# Patient Record
Sex: FEMALE | Race: ASIAN | NOT HISPANIC OR LATINO | Employment: UNEMPLOYED | URBAN - METROPOLITAN AREA
[De-identification: names, ages, dates, MRNs, and addresses within clinical notes are randomized per-mention and may not be internally consistent; named-entity substitution may affect disease eponyms.]

---

## 2019-06-04 ENCOUNTER — OFFICE VISIT - HEALTHEAST (OUTPATIENT)
Dept: FAMILY MEDICINE | Facility: CLINIC | Age: 39
End: 2019-06-04

## 2019-06-04 DIAGNOSIS — N64.4 BREAST PAIN, RIGHT: ICD-10-CM

## 2019-06-04 DIAGNOSIS — N91.2 AMENORRHEA: ICD-10-CM

## 2019-06-04 DIAGNOSIS — N63.41 LUMP IN CENTRAL PORTION OF RIGHT BREAST: ICD-10-CM

## 2019-06-04 LAB — HCG UR QL: NEGATIVE

## 2019-06-04 ASSESSMENT — MIFFLIN-ST. JEOR: SCORE: 1168.03

## 2019-06-10 ENCOUNTER — HOSPITAL ENCOUNTER (OUTPATIENT)
Dept: MAMMOGRAPHY | Facility: CLINIC | Age: 39
Discharge: HOME | End: 2019-06-10
Attending: FAMILY MEDICINE

## 2019-06-10 DIAGNOSIS — N64.4 BREAST PAIN, RIGHT: ICD-10-CM

## 2019-06-10 DIAGNOSIS — N63.41 LUMP IN CENTRAL PORTION OF RIGHT BREAST: ICD-10-CM

## 2021-03-31 ENCOUNTER — AMBULATORY - HEALTHEAST (OUTPATIENT)
Dept: NURSING | Facility: CLINIC | Age: 41
End: 2021-03-31

## 2021-04-21 ENCOUNTER — AMBULATORY - HEALTHEAST (OUTPATIENT)
Dept: NURSING | Facility: CLINIC | Age: 41
End: 2021-04-21

## 2021-05-29 NOTE — PROGRESS NOTES
OFFICE VISIT NOTE      Assessment & Plan   Nichelle Soriano is a 39 y.o. female with right breast pain that came on - she thinks- after lifting a heavy water bottle. She might have strained her muscles and now the inflammation is traveling around. However it might be that the pain is from the breast/breast lump, so we'll evaluate that with u/s and mammogram.  In the mean time, she'll use heat, massage and medications to manage the pain.    Diagnoses and all orders for this visit:    Breast pain, right  -     Cancel: Mammo Diagnostic Right; Future; Expected date: 06/04/2019  -     US Breast Limited (Focal) Right; Future; Expected date: 06/04/2019  -     Mammo Diagnostic Bilateral; Future; Expected date: 06/04/2019    Lump in central portion of right breast  -     Cancel: Mammo Diagnostic Right; Future; Expected date: 06/04/2019  -     US Breast Limited (Focal) Right; Future; Expected date: 06/04/2019  -     Mammo Diagnostic Bilateral; Future; Expected date: 06/04/2019    Amenorrhea  -     Pregnancy, Urine        Fifi Mason MD              Subjective:   Chief Complaint:  Pain (from R breast, over shoulder, to back after carrying heavy water bottle 2 weeks ago)    39 y.o. female.     1) carried some heavy water container, in early May  Pain started the next day  She's been to the ER twice  1st time GERD- gave a med (ranitidine per chart) that helped that, but breast still hurt  2nd time xray OK    Taken ibuprofen  Tylenol - taken by her at home  ER said not to take any med, but if needed, take ibuprofen      Current Outpatient Medications   Medication Sig     condoms latex lubricated Penelope Use as directed.     ferrous gluconate (FERGON) 324 MG tablet Take 1 tablet (324 mg total) by mouth daily with breakfast.     ibuprofen (ADVIL,MOTRIN) 400 MG tablet Take 400 mg by mouth 2 (two) times a day as needed.     norgestimate-ethinyl estradiol (ORTHO TRI-CYCLEN LO, 28,) 0.18/0.215/0.25 mg-25 mcg Tab tablet Take 1 tablet  "by mouth daily.     norgestimate-ethinyl estradiol (TRI-SPRINTEC, 28,) 0.18/0.215/0.25 mg-35 mcg (28) Tab tablet Take 1 tablet by mouth daily.     ranitidine (ZANTAC) 150 MG capsule Take 1 capsule (150 mg total) by mouth daily.       PSFHx: appropriate sections of PMH completed/filled in   Tobacco Status:  She  reports that she has never smoked. She has never used smokeless tobacco.    Review of Systems:  No fever.  No rash. All other systems negative except as noted above.    Objective:    /66 (Patient Site: Left Arm, Patient Position: Sitting, Cuff Size: Adult Regular)   Pulse 94   Temp 98.1  F (36.7  C) (Oral)   Ht 4' 11.5\" (1.511 m)   Wt 127 lb 12 oz (57.9 kg)   LMP 05/12/2019 (Exact Date)   SpO2 99%   BMI 25.37 kg/m    GENERAL: No acute distress.  HT: reg s1s2  Lungs: clear with good aeration  Chest wall: no bruising, redness or swelling;  Breasts: hang with no dimpling or nipple retraction; palpation on the left is negative; palpation on the right is either normal or there might be a flat mass about 1x1x0.5cm oval-shaped, just above the areola. It feels a lot like the surrounding breast tissue but is smoother.  Back: main pain is between the right scapula and the spine - where the muscle is tense but not in a knot  Right arm: ROM is full    CXR 5/27 was negative    Spent 25 min face to face with patient with more the 50% spent in counseling, education and coordination of care and discussing breast pain, muscle pain, medications.    "

## 2021-05-30 ENCOUNTER — RECORDS - HEALTHEAST (OUTPATIENT)
Dept: ADMINISTRATIVE | Facility: CLINIC | Age: 41
End: 2021-05-30

## 2021-05-31 ENCOUNTER — RECORDS - HEALTHEAST (OUTPATIENT)
Dept: ADMINISTRATIVE | Facility: CLINIC | Age: 41
End: 2021-05-31

## 2021-06-02 ENCOUNTER — RECORDS - HEALTHEAST (OUTPATIENT)
Dept: ADMINISTRATIVE | Facility: CLINIC | Age: 41
End: 2021-06-02

## 2021-06-03 VITALS — WEIGHT: 127.75 LBS | HEIGHT: 60 IN | BODY MASS INDEX: 25.08 KG/M2

## 2022-07-29 ENCOUNTER — OFFICE VISIT (OUTPATIENT)
Dept: FAMILY MEDICINE | Facility: CLINIC | Age: 42
End: 2022-07-29
Payer: COMMERCIAL

## 2022-07-29 VITALS
WEIGHT: 141.5 LBS | HEART RATE: 60 BPM | BODY MASS INDEX: 28.1 KG/M2 | SYSTOLIC BLOOD PRESSURE: 132 MMHG | DIASTOLIC BLOOD PRESSURE: 78 MMHG

## 2022-07-29 DIAGNOSIS — Z23 IMMUNIZATION DUE: ICD-10-CM

## 2022-07-29 DIAGNOSIS — M77.8 FOREARM TENDONITIS: Primary | ICD-10-CM

## 2022-07-29 PROCEDURE — 90715 TDAP VACCINE 7 YRS/> IM: CPT | Performed by: FAMILY MEDICINE

## 2022-07-29 PROCEDURE — 90471 IMMUNIZATION ADMIN: CPT | Performed by: FAMILY MEDICINE

## 2022-07-29 PROCEDURE — 99203 OFFICE O/P NEW LOW 30 MIN: CPT | Mod: 25 | Performed by: FAMILY MEDICINE

## 2022-07-29 ASSESSMENT — PATIENT HEALTH QUESTIONNAIRE - PHQ9
SUM OF ALL RESPONSES TO PHQ QUESTIONS 1-9: 6
10. IF YOU CHECKED OFF ANY PROBLEMS, HOW DIFFICULT HAVE THESE PROBLEMS MADE IT FOR YOU TO DO YOUR WORK, TAKE CARE OF THINGS AT HOME, OR GET ALONG WITH OTHER PEOPLE: NOT DIFFICULT AT ALL
SUM OF ALL RESPONSES TO PHQ QUESTIONS 1-9: 6

## 2022-07-29 NOTE — PROGRESS NOTES
Assessment & Plan     1. Forearm tendonitis  - Orthopedic  Referral; Future  - Wrist/Arm/Hand Supplies Order for DME - ONLY FOR DME  - Occupational Therapy Referral; Future  - diclofenac (VOLTAREN) 1 % topical gel; Apply 4 g topically 4 times daily as needed for moderate pain  Dispense: 350 g; Refill: 0    Symptoms are consistent with forearm tendinitis, more wrist dorsiflexion tendons rather than supination pronation or biceps tendinitis.  Recommend immobilization of the wrist to help decrease ongoing overuse.  Did not think Tylenol or ibuprofen are helping.  We will trial focal topical diclofenac gel.  Recommend trial of occupational therapy.  If symptoms not improving, recommend seeing orthopedics for further evaluation.    2. Immunization due  - TDAP VACCINE (Adacel, Boostrix)       Return in about 3 months (around 10/29/2022) for Physical Exam with pap .    Angelica Gonzales, [facility]    Preet Steward is a 42 year old accompanied by her sister, presenting for the following health issues:  Arm Pain (X1 yr)      Arm Pain    History of Present Illness       Reason for visit:  Right arm hurts    She eats 2-3 servings of fruits and vegetables daily.She consumes 4 sweetened beverage(s) daily.She exercises with enough effort to increase her heart rate 9 or less minutes per day.  She exercises with enough effort to increase her heart rate 3 or less days per week.   She is taking medications regularly.    Today's PHQ-9         PHQ-9 Total Score: 6    PHQ-9 Q9 Thoughts of better off dead/self-harm past 2 weeks :   Not at all    How difficult have these problems made it for you to do your work, take care of things at home, or get along with other people: Not difficult at all     Right handed.     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 Gradual worsening of arm pain, no trauma or injuries, not an acute onset. Pain is the  worst by the elbow, more towards the lateral side. Hurts with arm extension, feels better when flexes elbow. Is worse when picking things up. Rates pain 7-8 in the morning, feels okay at night when sleeping.    No weakness. No swelling in arm.     Using tylenol, icy hot. Hasn't helped at all. Has tried ibuprofen as well, not working.     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 Has not seen a doctor for these symptoms before.     HEALTH MAINTENANCE:   - tetanus: will do today    - PCV: declined     Review of Systems   See HPI above.       Objective    /78 (BP Location: Left arm, Patient Position: Sitting, Cuff Size: Adult Regular)   Pulse 60   Wt 64.2 kg (141 lb 8 oz)   LMP 06/19/2022 (Approximate)   Breastfeeding No   BMI 28.10 kg/m    Body mass index is 28.1 kg/m .  Physical Exam   GENERAL: healthy, alert and no distress  RESP: lungs clear to auscultation - no rales, rhonchi or wheezes  CV: regular rate and rhythm, normal S1 S2, no S3 or S4, no murmur, click or rub, no peripheral edema and peripheral pulses strong  MS: Normal shoulder range of motion bilaterally, normal elbow range of motion bilaterally, pain with active extension of arm pain.  Exacerbated with active wrist dorsiflexion.  No pain with active or passive supination or pronation.  No pain with passive elbow extension or wrist extension.      .  ..

## 2022-11-17 ENCOUNTER — OFFICE VISIT (OUTPATIENT)
Dept: FAMILY MEDICINE | Facility: CLINIC | Age: 42
End: 2022-11-17
Payer: COMMERCIAL

## 2022-11-17 VITALS
HEIGHT: 59 IN | RESPIRATION RATE: 12 BRPM | BODY MASS INDEX: 28.68 KG/M2 | HEART RATE: 65 BPM | WEIGHT: 142.25 LBS | SYSTOLIC BLOOD PRESSURE: 106 MMHG | DIASTOLIC BLOOD PRESSURE: 62 MMHG | TEMPERATURE: 98.5 F | OXYGEN SATURATION: 99 %

## 2022-11-17 DIAGNOSIS — M77.8 FOREARM TENDONITIS: ICD-10-CM

## 2022-11-17 DIAGNOSIS — Z32.01 PREGNANCY TEST POSITIVE: ICD-10-CM

## 2022-11-17 DIAGNOSIS — N92.6 MISSED PERIOD: Primary | ICD-10-CM

## 2022-11-17 LAB — HCG UR QL: POSITIVE

## 2022-11-17 PROCEDURE — 81025 URINE PREGNANCY TEST: CPT | Performed by: FAMILY MEDICINE

## 2022-11-17 PROCEDURE — 99213 OFFICE O/P EST LOW 20 MIN: CPT | Performed by: FAMILY MEDICINE

## 2022-11-17 RX ORDER — ACETAMINOPHEN 500 MG
500-1000 TABLET ORAL EVERY 6 HOURS PRN
Qty: 90 TABLET | Refills: 1 | Status: SHIPPED | OUTPATIENT
Start: 2022-11-17 | End: 2023-09-26

## 2022-11-17 RX ORDER — PRENATAL VIT/IRON FUM/FOLIC AC 27MG-0.8MG
1 TABLET ORAL DAILY
Qty: 90 TABLET | Refills: 3 | Status: SHIPPED | OUTPATIENT
Start: 2022-11-17 | End: 2023-09-26

## 2022-11-17 NOTE — PROGRESS NOTES
"  Assessment & Plan     Missed period  The patient has had regular periods monthlyExcept for the last 2 months.  Her home pregnancy test was positive    - HCG qualitative urine    Pregnancy test positive  Confirm pregnancy today by lab test.  She was informed of the results.  She will be started on prenatal vitamins she can set up an initial OB visit for self we discussed the potential complications of having a pregnancy started at the age of 42.  She understands that she should not consume any caffeinated products.  She should not smoke.  She should not drink any alcohol.  - Prenatal Vit-Fe Fumarate-FA (PRENATAL MULTIVITAMIN W/IRON) 27-0.8 MG tablet; Take 1 tablet by mouth daily    Forearm tendonitis    Because she is not pregnant she should not take any ibuprofen or anti-inflammatories I have prescribed Tylenol for her discomfort  - acetaminophen (TYLENOL) 500 MG tablet; Take 1-2 tablets (500-1,000 mg) by mouth every 6 hours as needed for mild pain             BMI:   Estimated body mass index is 28.73 kg/m  as calculated from the following:    Height as of this encounter: 1.499 m (4' 11\").    Weight as of this encounter: 64.5 kg (142 lb 4 oz).           Return in about 4 weeks (around 12/15/2022) for with any available provider.    Loi Trejo MD  [facility] ADEBAYO Steward is a 42 year old, presenting for the following health issues:  Pregnancy Test  History of presenting illness    42-year-old female here to discuss recent pregnancy test results at home she has noted.  Since September and has had a home pregnancy test which was positive.  She reports that she does not use contraception her youngest child is age 11 years.    History of Present Illness       Reason for visit:  Pregnancy test              Review of Systems   Constitutional, HEENT, cardiovascular, pulmonary, gi and gu systems are negative, except as otherwise noted.      Objective    /62   Pulse 65   Temp " "98.5  F (36.9  C) (Oral)   Resp 12   Ht 1.499 m (4' 11\")   Wt 64.5 kg (142 lb 4 oz)   LMP 09/09/2022 (Approximate)   SpO2 99%   BMI 28.73 kg/m    Body mass index is 28.73 kg/m .  Physical Exam   GENERAL: healthy, alert and no distress  MS: no gross musculoskeletal defects noted, no edema  SKIN: no suspicious lesions or rashes  NEURO: Normal strength and tone, mentation intact and speech normal  PSYCH: mentation appears normal, affect normal/bright            "

## 2022-11-23 ENCOUNTER — TELEPHONE (OUTPATIENT)
Dept: FAMILY MEDICINE | Facility: CLINIC | Age: 42
End: 2022-11-23

## 2022-11-23 DIAGNOSIS — Z32.01 PREGNANCY TEST POSITIVE: Primary | ICD-10-CM

## 2022-11-23 NOTE — TELEPHONE ENCOUNTER
Please call pt:  I see that she is on my scheduled for IOB on 12/9/22:  It would be helpful if she could get an ultrasound before the next appt so that we know her due date. I've put in an order. Someone should call her to schedule, or she can call them at  [phone].

## 2022-11-23 NOTE — TELEPHONE ENCOUNTER
Clinic RN contacted patient to relay provider message regarding initial OB and US.  Patient does not want to take down the phone number due to language barrier.  However, will wait for their call.  RN advised to call back to clinic if does not hear back by imaging department in the next three days for assistance.  Patient understood recommendation.    CHARLY Santos, RN  [facility]

## 2022-11-29 ENCOUNTER — TRANSFERRED RECORDS (OUTPATIENT)
Dept: HEALTH INFORMATION MANAGEMENT | Facility: CLINIC | Age: 42
End: 2022-11-29

## 2022-11-29 ENCOUNTER — MEDICAL CORRESPONDENCE (OUTPATIENT)
Dept: HEALTH INFORMATION MANAGEMENT | Facility: CLINIC | Age: 42
End: 2022-11-29

## 2022-11-29 ENCOUNTER — HOSPITAL ENCOUNTER (OUTPATIENT)
Dept: ULTRASOUND IMAGING | Facility: HOSPITAL | Age: 42
Discharge: HOME | End: 2022-11-29
Attending: FAMILY MEDICINE | Admitting: FAMILY MEDICINE
Payer: COMMERCIAL

## 2022-11-29 DIAGNOSIS — Z32.01 PREGNANCY TEST POSITIVE: ICD-10-CM

## 2022-11-29 PROCEDURE — 76801 OB US < 14 WKS SINGLE FETUS: CPT

## 2022-12-09 ENCOUNTER — PRENATAL OFFICE VISIT (OUTPATIENT)
Dept: FAMILY MEDICINE | Facility: CLINIC | Age: 42
End: 2022-12-09
Payer: COMMERCIAL

## 2022-12-09 VITALS
BODY MASS INDEX: 28.48 KG/M2 | TEMPERATURE: 98.3 F | SYSTOLIC BLOOD PRESSURE: 110 MMHG | HEART RATE: 68 BPM | HEIGHT: 59 IN | OXYGEN SATURATION: 99 % | WEIGHT: 141.25 LBS | RESPIRATION RATE: 16 BRPM | DIASTOLIC BLOOD PRESSURE: 60 MMHG

## 2022-12-09 DIAGNOSIS — D56.8 OTHER THALASSEMIA (H): ICD-10-CM

## 2022-12-09 DIAGNOSIS — N96 RECURRENT PREGNANCY LOSS: ICD-10-CM

## 2022-12-09 DIAGNOSIS — O09.529 HIGH-RISK PREGNANCY, ELDERLY MULTIGRAVIDA, UNSPECIFIED TRIMESTER: Primary | ICD-10-CM

## 2022-12-09 DIAGNOSIS — Z98.891 HISTORY OF CESAREAN DELIVERY: ICD-10-CM

## 2022-12-09 PROCEDURE — 99215 OFFICE O/P EST HI 40 MIN: CPT | Performed by: FAMILY MEDICINE

## 2022-12-09 NOTE — PROGRESS NOTES
Assessment /Plan     Nichelle Soriano is a 42 year old   at 10w0d who was scheduled for her IOB appointment today.  However, after extensive discussion of her history and risk factors, we have decided together that she would be best serves by seeing OBGYN for this pregnancy.  Therefore, I did not obtain IOB labs today in anticipation of an OBGYN appt soon.    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    I spent a total of 50 minutes on the day of the visit.   Time spent doing chart review, history and exam, documentation and further activities per the note   Extensive chart review was needed due to patient not having exact details of previous deliveries and then being difficult to find in the archives.  Also had to get records through care everywhere from [facility].    Subjective:    Nichelle Soriano is a 42 year old  here today for her First Obstetrical Exam.     Was feeling very tired, but now better.  No questions or concerns  She has 4 living children and history of several miscarriages and stillbirths.  Last pregnancy was in .  We have not had records of that, because it was through [facility] but records were obtained showing 35-week 1 day delivery by  after unsuccessful induction for hydrops.  Hydrops is contributed to the death of 2 other children, and this was presumably due to thalassemia disease as both she and her  her alpha thalassemia carriers.  Father of her children is the same for all pregnancies, including this 1.    OB History    Para Term  AB Living   11 7 5 2 3 4   SAB IAB Ectopic Multiple Live Births   3 0 0 0 4  "     # Outcome Date GA Lbr Lexa/2nd Weight Sex Delivery Anes PTL Lv   11 Current            10  11 35w1d   F CS-LTranv   FRANK      Birth Comments: Unsuccessful TOLAC induction for hydrops, so C-sxn done      Name: Celeste   9 Term 09 39w0d  3.062 kg (6 lb 12 oz) F    FRANK      Name: Alla   8 Term 08 40w0d  3.487 kg (7 lb 11 oz) M    FRANK      Name: Dewey   7 SAB  4w0d          6   33w5d   M    FD      Birth Comments: IUFD, hydrops   5 Term  40w0d  3.629 kg (8 lb) M    FRANK      Name: Rony   4 SAB  8w0d          3 SAB  8w0d          2 Term  40w0d   F CS-Unspec   FD      Birth Comments: IUFD, hydrops   1 Term                History reviewed. No pertinent past medical history.  Past Surgical History:   Procedure Laterality Date      SECTION       Social History     Tobacco Use     Smoking status: Never     Smokeless tobacco: Never     Tobacco comments:     no passive exposure   Substance Use Topics     Alcohol use: No     Drug use: No     Current Outpatient Medications   Medication Sig Dispense Refill     acetaminophen (TYLENOL) 500 MG tablet Take 1-2 tablets (500-1,000 mg) by mouth every 6 hours as needed for mild pain 90 tablet 1     Prenatal Vit-Fe Fumarate-FA (PRENATAL MULTIVITAMIN W/IRON) 27-0.8 MG tablet Take 1 tablet by mouth daily 90 tablet 3     Allergies   Allergen Reactions     No Known Drug Allergies Unknown       Family History   Problem Relation Age of Onset     Thalassemia Daughter         Deanna Thalassemia DISEASE     Hashimoto's thyroiditis Daughter      No Known Problems Son      No Known Problems Son        Objective:   Objective    Vitals:    22 0929   BP: 110/60   Pulse: 68   Resp: 16   Temp: 98.3  F (36.8  C)   TempSrc: Oral   SpO2: 99%   Weight: 64.1 kg (141 lb 4 oz)   Height: 1.499 m (4' 11\")     Physical Exam:  General Appearance: Alert, cooperative, no distress, appears stated age  Abd: Uterine fundus below " pubic symphysis.  Fetal heart tones not audible by [place]      Diagnostics:    EXAM: US OB < 14 WEEKS SINGLE-TRANSABDOMINAL   LOCATION: [facility]   DATE/TIME: 11/29/2022 9:15 AM   INDICATION: Positive pregnancy; please eval for viability and gestational age   COMPARISON: None.   TECHNIQUE: Transabdominal scans were performed.   FINDINGS: UTERUS: Single normal appearing intrauterine gestation sac. CRL: Measures 1.9 cm, equals 8 weeks 4 days. FETAL HEART RATE: 165 bpm. AMNIOTIC FLUID: Normal. PLACENTA: Not yet formed. No evidence for sub-chorionic hemorrhage. RIGHT OVARY: Normal. LEFT OVARY: Normal.   IMPRESSION: 1.  Single living intrauterine gestation at 8 weeks 4 days, EDC 7/7/2023.

## 2022-12-28 ENCOUNTER — TRANSFERRED RECORDS (OUTPATIENT)
Dept: HEALTH INFORMATION MANAGEMENT | Facility: CLINIC | Age: 42
End: 2022-12-28

## 2022-12-28 ENCOUNTER — LAB REQUISITION (OUTPATIENT)
Dept: LAB | Facility: CLINIC | Age: 42
End: 2022-12-28

## 2022-12-28 DIAGNOSIS — O09.91 SUPERVISION OF HIGH RISK PREGNANCY, UNSPECIFIED, FIRST TRIMESTER: ICD-10-CM

## 2022-12-28 LAB
BASOPHILS # BLD AUTO: 0 10E3/UL (ref 0–0.2)
BASOPHILS NFR BLD AUTO: 0 %
EOSINOPHIL # BLD AUTO: 0.2 10E3/UL (ref 0–0.7)
EOSINOPHIL NFR BLD AUTO: 3 %
ERYTHROCYTE [DISTWIDTH] IN BLOOD BY AUTOMATED COUNT: 16.3 % (ref 10–15)
HCT VFR BLD AUTO: 36.4 % (ref 35–47)
HGB BLD-MCNC: 11.3 G/DL (ref 11.7–15.7)
HOLD SPECIMEN: NORMAL
IMM GRANULOCYTES # BLD: 0 10E3/UL
IMM GRANULOCYTES NFR BLD: 1 %
LYMPHOCYTES # BLD AUTO: 1.3 10E3/UL (ref 0.8–5.3)
LYMPHOCYTES NFR BLD AUTO: 18 %
MCH RBC QN AUTO: 21.3 PG (ref 26.5–33)
MCHC RBC AUTO-ENTMCNC: 31 G/DL (ref 31.5–36.5)
MCV RBC AUTO: 69 FL (ref 78–100)
MONOCYTES # BLD AUTO: 0.5 10E3/UL (ref 0–1.3)
MONOCYTES NFR BLD AUTO: 7 %
NEUTROPHILS # BLD AUTO: 5.3 10E3/UL (ref 1.6–8.3)
NEUTROPHILS NFR BLD AUTO: 71 %
NRBC # BLD AUTO: 0 10E3/UL
NRBC BLD AUTO-RTO: 0 /100
PLATELET # BLD AUTO: 373 10E3/UL (ref 150–450)
RBC # BLD AUTO: 5.31 10E6/UL (ref 3.8–5.2)
WBC # BLD AUTO: 7.3 10E3/UL (ref 4–11)

## 2022-12-28 PROCEDURE — 85025 COMPLETE CBC W/AUTO DIFF WBC: CPT | Performed by: OBSTETRICS & GYNECOLOGY

## 2022-12-28 PROCEDURE — 86901 BLOOD TYPING SEROLOGIC RH(D): CPT | Performed by: OBSTETRICS & GYNECOLOGY

## 2022-12-28 PROCEDURE — 86780 TREPONEMA PALLIDUM: CPT | Performed by: OBSTETRICS & GYNECOLOGY

## 2022-12-28 PROCEDURE — 86803 HEPATITIS C AB TEST: CPT | Performed by: OBSTETRICS & GYNECOLOGY

## 2022-12-28 PROCEDURE — 87086 URINE CULTURE/COLONY COUNT: CPT | Performed by: OBSTETRICS & GYNECOLOGY

## 2022-12-28 PROCEDURE — 87340 HEPATITIS B SURFACE AG IA: CPT | Performed by: OBSTETRICS & GYNECOLOGY

## 2022-12-28 PROCEDURE — 87389 HIV-1 AG W/HIV-1&-2 AB AG IA: CPT | Performed by: OBSTETRICS & GYNECOLOGY

## 2022-12-28 PROCEDURE — 87491 CHLMYD TRACH DNA AMP PROBE: CPT | Performed by: OBSTETRICS & GYNECOLOGY

## 2022-12-29 ENCOUNTER — TRANSFERRED RECORDS (OUTPATIENT)
Dept: HEALTH INFORMATION MANAGEMENT | Facility: CLINIC | Age: 42
End: 2022-12-29

## 2022-12-29 LAB
ABO/RH(D): NORMAL
ANTIBODY SCREEN: NEGATIVE
C TRACH DNA SPEC QL PROBE+SIG AMP: NEGATIVE
HBV SURFACE AG SERPL QL IA: NONREACTIVE
HCV AB SERPL QL IA: NONREACTIVE
HIV 1+2 AB+HIV1 P24 AG SERPL QL IA: NONREACTIVE
N GONORRHOEA DNA SPEC QL NAA+PROBE: NEGATIVE
SPECIMEN EXPIRATION DATE: NORMAL
T PALLIDUM AB SER QL: NONREACTIVE

## 2022-12-30 LAB — BACTERIA UR CULT: NO GROWTH

## 2023-01-11 ENCOUNTER — TRANSFERRED RECORDS (OUTPATIENT)
Dept: HEALTH INFORMATION MANAGEMENT | Facility: CLINIC | Age: 43
End: 2023-01-11

## 2023-01-11 ENCOUNTER — MEDICAL CORRESPONDENCE (OUTPATIENT)
Dept: HEALTH INFORMATION MANAGEMENT | Facility: CLINIC | Age: 43
End: 2023-01-11

## 2023-01-12 ENCOUNTER — TRANSFERRED RECORDS (OUTPATIENT)
Dept: HEALTH INFORMATION MANAGEMENT | Facility: CLINIC | Age: 43
End: 2023-01-12

## 2023-01-12 ENCOUNTER — APPOINTMENT (OUTPATIENT)
Dept: INTERPRETER SERVICES | Facility: CLINIC | Age: 43
End: 2023-01-12
Payer: COMMERCIAL

## 2023-01-12 ENCOUNTER — TRANSCRIBE ORDERS (OUTPATIENT)
Dept: MATERNAL FETAL MEDICINE | Facility: HOSPITAL | Age: 43
End: 2023-01-12

## 2023-01-12 DIAGNOSIS — O26.90 PREGNANCY RELATED CONDITION, ANTEPARTUM: Primary | ICD-10-CM

## 2023-01-12 DIAGNOSIS — D56.8 OTHER THALASSEMIA (H): Primary | ICD-10-CM

## 2023-01-13 ENCOUNTER — MEDICAL CORRESPONDENCE (OUTPATIENT)
Dept: HEALTH INFORMATION MANAGEMENT | Facility: CLINIC | Age: 43
End: 2023-01-13

## 2023-01-25 ENCOUNTER — LAB REQUISITION (OUTPATIENT)
Dept: LAB | Facility: CLINIC | Age: 43
End: 2023-01-25

## 2023-01-25 DIAGNOSIS — Z3A.16 16 WEEKS GESTATION OF PREGNANCY: ICD-10-CM

## 2023-01-25 LAB — HOLD SPECIMEN: NORMAL

## 2023-01-25 PROCEDURE — 82105 ALPHA-FETOPROTEIN SERUM: CPT | Performed by: OBSTETRICS & GYNECOLOGY

## 2023-01-27 ENCOUNTER — PRE VISIT (OUTPATIENT)
Dept: MATERNAL FETAL MEDICINE | Facility: HOSPITAL | Age: 43
End: 2023-01-27
Payer: COMMERCIAL

## 2023-01-28 LAB
# FETUSES US: NORMAL
AFP MOM SERPL: 0.92
AFP SERPL-MCNC: 36 NG/ML
AGE - REPORTED: 41.5 YR
CURRENT SMOKER: NO
FAMILY MEMBER DISEASES HX: NORMAL
GA METHOD: NORMAL
GA: NORMAL WK
IDDM PATIENT QL: NO
INTEGRATED SCN PATIENT-IMP: NORMAL
SPECIMEN DRAWN SERPL: NORMAL

## 2023-02-02 ENCOUNTER — ANCILLARY PROCEDURE (OUTPATIENT)
Dept: ULTRASOUND IMAGING | Facility: HOSPITAL | Age: 43
End: 2023-02-02
Attending: OBSTETRICS & GYNECOLOGY
Payer: COMMERCIAL

## 2023-02-02 ENCOUNTER — OFFICE VISIT (OUTPATIENT)
Dept: MATERNAL FETAL MEDICINE | Facility: HOSPITAL | Age: 43
End: 2023-02-02
Attending: OBSTETRICS & GYNECOLOGY
Payer: COMMERCIAL

## 2023-02-02 ENCOUNTER — LAB (OUTPATIENT)
Dept: LAB | Facility: HOSPITAL | Age: 43
End: 2023-02-02
Attending: OBSTETRICS & GYNECOLOGY
Payer: COMMERCIAL

## 2023-02-02 ENCOUNTER — TRANSFERRED RECORDS (OUTPATIENT)
Dept: HEALTH INFORMATION MANAGEMENT | Facility: CLINIC | Age: 43
End: 2023-02-02

## 2023-02-02 DIAGNOSIS — D56.8 OTHER THALASSEMIA (H): Primary | ICD-10-CM

## 2023-02-02 DIAGNOSIS — Z36.89 ENCOUNTER FOR FETAL ANATOMIC SURVEY: Primary | ICD-10-CM

## 2023-02-02 DIAGNOSIS — D56.8 OTHER THALASSEMIA (H): ICD-10-CM

## 2023-02-02 PROCEDURE — 76805 OB US >/= 14 WKS SNGL FETUS: CPT | Mod: 26 | Performed by: STUDENT IN AN ORGANIZED HEALTH CARE EDUCATION/TRAINING PROGRAM

## 2023-02-02 PROCEDURE — 99204 OFFICE O/P NEW MOD 45 MIN: CPT | Mod: 25 | Performed by: STUDENT IN AN ORGANIZED HEALTH CARE EDUCATION/TRAINING PROGRAM

## 2023-02-02 PROCEDURE — 36415 COLL VENOUS BLD VENIPUNCTURE: CPT

## 2023-02-02 PROCEDURE — 76805 OB US >/= 14 WKS SNGL FETUS: CPT

## 2023-02-02 PROCEDURE — 76821 MIDDLE CEREBRAL ARTERY ECHO: CPT | Mod: 26 | Performed by: STUDENT IN AN ORGANIZED HEALTH CARE EDUCATION/TRAINING PROGRAM

## 2023-02-02 PROCEDURE — 96040 HC GENETIC COUNSELING, EACH 30 MINUTES: CPT | Performed by: GENETIC COUNSELOR, MS

## 2023-02-02 NOTE — NURSING NOTE
Patient reports good fetal movements, denies pain, denies contractions, leaking of fluid, or bleeding. SBAR given to VIJAY HASSAN, see their note in Epic.

## 2023-02-02 NOTE — PROGRESS NOTES
Please see the full imaging report from the [employer] program under the imaging tab.    Ladan Rosen MD  Maternal Fetal Medicine

## 2023-02-02 NOTE — PROGRESS NOTES
[facility]  Genetic Counseling Consult    Patient:  Nichelle Soriano YOB: 1980   Date of Service:  23   MRN: [id number]    Nichelle was seen at the [facility] for genetic consultation. The indication for genetic counseling is family history concern. The patient was accompanied to this visit by their . The patient and their accompanied individual is wearing a mask due to current [facility] policies.      The session was conducted with a [employer] ipad  due to the patient speaking limited English.      IMPRESSION/ PLAN   1. Nichelle had genetic screening earlier in this pregnancy. Their non-invasive prenatal test was screen negative or low risk for screened conditions     2. During today's [facility] visit, Nichelle had a blood draw for [place] carrier screening and fetal risk assessment for alpha thalassemia. Results are expected in 2-3 weeks. The patient will be called with results and if they do not answer they requested a detailed message with results on their voicemail.     Sulaiman pregnancy is at a 1 in 4 (25%) chance of having alpha thalassemia major (Hb Barts) which unfortunately is associated with a high chance of fetal demise or critically ill  requiring extensive care. If this non-invasive option resulted positive, we would be managing the pregnancy very closely as if there was a confirmed diagnosis of the condition. If this non-invasive option resulted negative, the residual chance would be 1 in 400. Management would still be increased but there would be less concern over prenatal features.     3. Nichelle had a level II comprehensive anatomy ultrasound today. Please see the ultrasound report for further details.    4. Nichelle is scheduled for follow-up ultrasound with [facility] for 2023.     PREGNANCY HISTORY   /Parity:       Sulaiman pregnancy history is  significant for:     2000: 40w, IUFD, hydrops (limited info)    2003: 8w SAB    2003: 8w SAB    2004: 40w, male    2006: 33w5d, IUFD, hydrops (limited info)    2006: 4w SAB    06/08: 40w, male    07/09: 39w, female    01/11: 35w1d, hydrops, induction of labor leading to c section, this daughter survived due to weekly blood transfusions before a bone marrow transplants around 4-5 years of age from an unrelated donor     CURRENT PREGNANCY   Current Age: 43 year old   Age at Delivery: 43 year old  GONZALEZ: 7/7/2023, by Ultrasound                                   Gestational Age: 17w6d  This pregnancy is a single gestation.   This pregnancy was conceived spontaneously.    MEDICAL HISTORY   Nichelle garrison reported medical history is not expected to impact pregnancy management or risks to fetal development.       FAMILY HISTORY   A three-generation pedigree was not obtained today due to our focus on other topics. Nichelle states her and her 's family is healthy.    Naya's children that passed in 2000 and 2006 very likely had the same condition, alpha thalassemia, from which their daughter was ill. Multiple documentation describes those intrauterine fetal demises as having the complication of hydrops, which is a hallmark prenatal sign of alpha thalassemia major (also called Hb Barts). Documentation from her pregnancy in 2011 also notes the pregnancy had hydrops consistent with Hb Barts. Nichelle provided verbal permission to access her daughter's chart which documents treatment for Hb Barts (four full gene deletions). [facility] Nichelle to be a carrier (??/--).    Hemoglobin is a major component of red blood cells. Hemoglobinopathies are conditions that affect the level or structure of the hemoglobin and cause conditions such as thalassemia or conditions like sickle cell disease. An individual with a hemoglobinopathy often inherited the  condition from their carrier parents. Some carriers can also have symptoms. Lab values like mean corpuscular volume (MCV) can suggest a hemoglobinopathy is present when the value is low.     The following is true for a typical individual:    4 copies of alpha genes that encode for the alpha subunit of hemoglobin (2 copies of HBA1 gene + 2 copies of HBA2 gene)     2 copies of HBB gene that encodes for the beta subunit     2 copies of HBD gene that encodes for the delta subunit     4 copies of gamma genes that encode for the gamma subunit of hemoglobin. This is typically only expressed during fetal development and decreases after birth when the beta subunit is more represented    Thalassemia conditions are caused by reductions in the alpha or beta subunit. Variant conditions, like sickle cell disease, are cause by mutations in the HBB gene that modify the structure or function of the beta subunit. Hemoglobinopathies have a wide spectrum of symptoms because an individual can have a condition due to the combination of different mutations or even mutations on an alpha and beta gene. Many hemoglobinopathies are screened for on the [place] Screening program.    Alpha thalassemia is caused by a reduction in the alpha subunit. We discussed common genotypes for carriers and affected individuals of alpha thalassemia:    Typical: (??/??) Four copies of alpha    Silent Carrier: (??/?-) Three copies of alpha    Typically asymptomatic    Alpha Thalassemia trait: (??/--) or (?-/?-) Two copies of alpha    Typically range from asymptomatic to mild anemia and abnormal red blood cell labs.     The arrangement of alpha copies is important to determine because in the situation in which the two copies are on one chromosome, that parent could pass on zero copies of alpha genes (--) or (??).      Hemoglobin H Disease: (?-/--) One copy of alpha    Symptoms can range from asymptomatic to anemia with jaundice, fatigue, bone  deformities, fatigue, and other minor complications.symptoms can range from asymptomatic to anemia with jaundice, fatigue, bone deformities, fatigue, and other minor complications    Detected on  screen due to detection of hemoglobin barts which is four copies of the gamma (??/??) subunit stuck together due to a shortage of alpha subunits to join with. Typically fetal hemoglobin should be (??/??)    Alpha thalassemia major/ Hb Barts hydrops fetalis:  (--/--) Zero copies of alpha    This condition is associated with death in utero due to hydrops fetalis. If born an affected baby will be likely stillborn or die soon after birth. Experimental prenatal bone marrow transplants may be curative. If babies survive past birth they are typically dependent on chronic transfusions and often need a curative bone marrow transplant.     Detected on  screen due to detection of hemoglobin barts which is four copies of the gamma (??/??) subunit stuck together due to a shortage of alpha subunits to join with. Typically fetal hemoglobin should be (??/??)    We reviewed the reproductive risks of the alpha combinations that Nichelle (??/--) and her partner (likely (??/--)) could pass to their children:  1) 25% (??/??)   2) 50% (??/--)   3) 25%  (--/--) Hb Barts     Nichelle's healthy children are either (??/??) or  (??/--) [carriers like Nichelle and her ]. Nichelle's babies that passed away and their daughter with health issues is  (--/--) Hb Barts . This pregnancy also has a 25% chance of having Hb Barts.     CARRIER SCREENING   Expanded carrier screening is available to screen for autosomal recessive conditions and X-linked conditions in a large list of genes. Autosomal recessive conditions happen when a mutation has been inherited from the egg and sperm and include conditions like cystic fibrosis, thalassemia, hearing loss, spinal muscular atrophy, and more. X-linked conditions happen when a mutation has  been inherited from the egg and include conditions like fragile X syndrome.  screening was also reviewed. About [place]  Screening    Carrier screening was not discussed today. If the patient is interested in further discussing the option of carrier screening, MFM would be available to assist in coordination if desired.       RISK ASSESSMENT FOR CHROMOSOME CONDITIONS   We explained that the risk for fetal chromosome abnormalities increases with maternal age. We discussed specific features of common chromosome abnormalities, including Down syndrome, trisomy 13, trisomy 18, and sex chromosome trisomies.      At age 43 at midtrimester, the risk to have a baby with Down syndrome is 1 in 56.     At age 43 at midtrimester, the risk to have a baby with any chromosome abnormality is 1 in 31.     Nichelle had genetic screening earlier in this pregnancy. Their non-invasive prenatal test was screen negative or low risk for screened conditions     Non-invasive prenatal testing (NIPT) results    Maternal plasma cell-free DNA testing    Screens for fetal trisomy 21, trisomy 13, trisomy 18, and sex chromosome aneuploidy    First trimester ultrasound with nuchal translucency and nasal bone assessment was not performed in this pregnancy, to our knowledge.    Nichelle had a NIPT test earlier in pregnancy; we reviewed the results today, which are low risk.    The NIPT did include sex chromosome aneuploidies and the result was low risk. The predicted sex is XY, which is typically male.    Given the accuracy of this test, these results greatly decrease the chance for certain fetal chromosome abnormalities    We discussed the limitations of normal NIPT results    Maternal serum AFP only to screen for open neural tube defects (after 15 weeks) results were within normal limits at a 0.92 MoM, which decreased the risk of an open neural tube defect to <1 in 10,000.     GENETIC TESTING OPTIONS   Genetic testing during a pregnancy  includes screening and diagnostic procedures.      Screening tests are non-invasive which means no risk to the pregnancy and includes ultrasounds and blood work. The benefits and limitations of screening were reviewed. Screening tests provide a risk assessment (chance) specific to the pregnancy for certain fetal chromosome abnormalities but cannot definitively diagnose or exclude a fetal chromosome abnormality. Follow-up genetic counseling and consideration of diagnostic testing is recommended with any abnormal screening result. Diagnostic testing during a pregnancy is more certain and can test for more conditions. However, the tests do have a risk of miscarriage that requires careful consideration. These tests can detect fetal chromosome abnormalities with greater than 99% certainty. Results can be compromised by maternal cell contamination or mosaicism and are limited by the resolution of current genetic testing technology.     There is no screening or diagnostic test that detects all forms of birth defects or intellectual disability.     We discussed the following screening options:   [employer] has a screen called UNITY which uses similar technology to cell-free DNA screening for aneuploidy, often called non-invasive prenatal test (NIPT). During pregnancy cell-free DNA from the placenta trophoblast is circulating in the pregnant person's bloodstream, in addition to their own cell-free DNA. The [employer] test first performs routine carrier screening for cystic fibrosis, spinal muscular atrophy and hemoglobinopathies (HBB, HBA1, HBA2) to determine maternal carrier status through sequencing. If the mother is determined to be a carrier, the testing then reflexes to look for additional variants or additional changes in copy numbers, detected at low levels that would be representative of the cell-free DNA from the pregnancy.     The benefits of [employer] is that the father of the pregnancy's sample is not needed and  "the test is non-invasive (just blood work from the mother's arm). The testing \"jumps over\" traditional carrier screening and looks for the presence of the disease in the pregnancy. This is NOT an alternative to diagnostic testing such as an amniocentesis. Since UNITY is a screen, there are false positives and false negatives.     We discussed the following ultrasound options:  Comprehensive level II ultrasound (Fetal Anatomy Ultrasound)    Ultrasound done between 18-20 weeks gestation    Screens for major birth defects and markers for aneuploidy (like trisomy 21 and trisomy 18)    Includes looking at the fetus/baby's growth, heart, organs (stomach, kidneys), placenta, and amniotic fluid    We discussed the following diagnostic options:   Amniocentesis    Invasive diagnostic procedure done after 15 weeks gestation    The procedure collects a small sample of amniotic fluid for the purpose of chromosomal testing and/or other genetic testing    Diagnostic result; more than 99% sensitivity for fetal chromosome abnormalities    Testing for AFP in the amniotic fluid can test for open neural tube defects    It was a pleasure to be involved with Nichelle garrison care. Face-to-face time of the meeting was 45 minutes.    Jelena Banda GC, MS, LGC  Certified and [place] Licensed Genetic Counselor  [facility]  Maternal Fetal Medicine  Office: [phone]  [facility]: [phone]   Fax: [phone]  [facility]                "

## 2023-02-28 ENCOUNTER — DOCUMENTATION ONLY (OUTPATIENT)
Dept: MATERNAL FETAL MEDICINE | Facility: CLINIC | Age: 43
End: 2023-02-28
Payer: COMMERCIAL

## 2023-02-28 NOTE — PROGRESS NOTES
Problem: Patient Care Overview (Infant)  Goal: Plan of Care Review  Outcome: Ongoing (interventions implemented as appropriate)    Problem: [place] (,NICU)  Goal: Signs and Symptoms of Listed Potential Problems Will be Absent or Manageable ()  Outcome: Ongoing (interventions implemented as appropriate)       Reviewed [place] result with [facility] physician upon receiving. Given the complex information and significant barriers (need of ) the decision was made to disclose results to Nichelle at her [facility] appointment on 2023. An in person [facility]  was requested and scheduled.    Nichelle and her partner are both carriers of alpha thalassemia (??/--). They have two children, likely affected with Hb Barts that  in [place], and one daughter that was born in [place] with Hb Barts (--/--) but fortunately survived to a bone marrow transplant and is doing well. We discussed they are at a 25% chance for each pregnancy, including this one, to be affected with Hb Barts.     Nichelle had blood drawn for an non-invasive screen for the fetal risk of alpha thalassemia given her carrier status. This test initially put the pregnancy at increased risk. However, further testing was able to identify a paternal variant in the region of the alpha genes. Since that variant was detected it means the paternal allele does NOT have the deletion. Therefore, the lab is amending the report to a reduced risk of Hb Barts in this pregnancy to 1 in 400. This means the pregnancy is likely NOT affected with Hb Barts. However, it does not eliminate the risk completely.     Jelena Banda MS, [facility]  Licensed Genetic Counselor   [facility]  Maternal Fetal Medicine  [email or web address]  [email or web address]  Office: [phone]  Pager [phone]  [facility]: [phone]   Fax: [phone]

## 2023-03-02 ENCOUNTER — ANCILLARY PROCEDURE (OUTPATIENT)
Dept: ULTRASOUND IMAGING | Facility: HOSPITAL | Age: 43
End: 2023-03-02
Attending: STUDENT IN AN ORGANIZED HEALTH CARE EDUCATION/TRAINING PROGRAM
Payer: COMMERCIAL

## 2023-03-02 ENCOUNTER — OFFICE VISIT (OUTPATIENT)
Dept: MATERNAL FETAL MEDICINE | Facility: HOSPITAL | Age: 43
End: 2023-03-02
Attending: STUDENT IN AN ORGANIZED HEALTH CARE EDUCATION/TRAINING PROGRAM
Payer: COMMERCIAL

## 2023-03-02 DIAGNOSIS — D56.8 OTHER THALASSEMIA (H): ICD-10-CM

## 2023-03-02 DIAGNOSIS — Z87.59 HISTORY OF IUFD: ICD-10-CM

## 2023-03-02 DIAGNOSIS — D56.8 OTHER THALASSEMIA (H): Primary | ICD-10-CM

## 2023-03-02 PROCEDURE — 76816 OB US FOLLOW-UP PER FETUS: CPT | Mod: 26 | Performed by: STUDENT IN AN ORGANIZED HEALTH CARE EDUCATION/TRAINING PROGRAM

## 2023-03-02 PROCEDURE — 76816 OB US FOLLOW-UP PER FETUS: CPT

## 2023-03-02 PROCEDURE — 99207 PR NO CHARGE LOS: CPT | Performed by: STUDENT IN AN ORGANIZED HEALTH CARE EDUCATION/TRAINING PROGRAM

## 2023-03-02 PROCEDURE — 999N000069 HC STATISTIC GENETIC COUNSELING, < 16 MIN: Performed by: GENETIC COUNSELOR, MS

## 2023-03-02 NOTE — PROGRESS NOTES
[facility]  Genetic Counseling Consult    Patient:  Nichelle Soriano YOB: 1980   Date of Service:  3/02/23   MRN: [id number]    Nichelle was seen at the [facility] [place] with an in person [employer]  for genetic consultation. The indication for genetic counseling is review Hb [place] prenatal screening results.     The session was conducted with a [employer] ipad  due to the patient speaking limited English.      IMPRESSION/ PLAN   I met with Jazalexa at the request of Dr. Ladan Rosen to review [place] results for Hb Barts/alpha thalassemia major. Based on the cell-free fetal DNA testing results,  the residual chance for the current pregnancy to have Hb Barts/ alpha thalassemia major is 1 in 400. Since this test is a screening test, these results decrease the risk, but do not eliminate the risk for Hb Barts. Nichelle verbalized understanding and will return for follow-up in 4 weeks.     It was a pleasure to be involved with Nichelle garrison care. Face-to-face time of the meeting was 15 minutes.    Cece Pinto, GC, MS, LGC  Certified and [place] Licensed Genetic Counselor  [facility]  Maternal Fetal Medicine  Office: [phone]  [facility]: [phone]   Fax: [phone]  [facility]

## 2023-03-02 NOTE — NURSING NOTE
Patient reports positive fetal movement, denies pain, contractions, leaking of fluid, or bleeding. Patient denies headache, visual changes, nausea/vomiting, epigastric pain related to preeclampsia. SBAR given to VIJAY HASSAN, see their note in Epic.

## 2023-03-10 LAB
Lab: NORMAL
PERFORMING LABORATORY: NORMAL
TEST NAME: NORMAL

## 2023-03-30 ENCOUNTER — ANCILLARY PROCEDURE (OUTPATIENT)
Dept: ULTRASOUND IMAGING | Facility: HOSPITAL | Age: 43
End: 2023-03-30
Attending: STUDENT IN AN ORGANIZED HEALTH CARE EDUCATION/TRAINING PROGRAM
Payer: COMMERCIAL

## 2023-03-30 ENCOUNTER — OFFICE VISIT (OUTPATIENT)
Dept: MATERNAL FETAL MEDICINE | Facility: HOSPITAL | Age: 43
End: 2023-03-30
Attending: STUDENT IN AN ORGANIZED HEALTH CARE EDUCATION/TRAINING PROGRAM
Payer: COMMERCIAL

## 2023-03-30 DIAGNOSIS — D56.8 OTHER THALASSEMIA (H): ICD-10-CM

## 2023-03-30 DIAGNOSIS — Z87.59 HISTORY OF IUFD: ICD-10-CM

## 2023-03-30 DIAGNOSIS — O09.292 HISTORY OF INTRAUTERINE FETAL DEATH, CURRENTLY PREGNANT IN SECOND TRIMESTER: Primary | ICD-10-CM

## 2023-03-30 PROCEDURE — 99207 PR NO CHARGE LOS: CPT | Performed by: OBSTETRICS & GYNECOLOGY

## 2023-03-30 PROCEDURE — 76816 OB US FOLLOW-UP PER FETUS: CPT | Mod: 26 | Performed by: OBSTETRICS & GYNECOLOGY

## 2023-03-30 PROCEDURE — 76816 OB US FOLLOW-UP PER FETUS: CPT

## 2023-03-30 NOTE — NURSING NOTE
Lyssa  via iPad. Patient reports positive fetal movement, denies pain, denies contractions, leaking of fluid, or bleeding. SBAR given to VIJAY HASSAN, see their note in Epic.

## 2023-03-30 NOTE — PROGRESS NOTES
Please see the imaging tab for details of the ultrasound performed today.    Anita Jain MD  Specialist in Maternal-Fetal Medicine

## 2023-04-20 ENCOUNTER — LAB REQUISITION (OUTPATIENT)
Dept: LAB | Facility: CLINIC | Age: 43
End: 2023-04-20

## 2023-04-20 DIAGNOSIS — Z13.0 ENCOUNTER FOR SCREENING FOR DISEASES OF THE BLOOD AND BLOOD-FORMING ORGANS AND CERTAIN DISORDERS INVOLVING THE IMMUNE MECHANISM: ICD-10-CM

## 2023-04-20 LAB
ERYTHROCYTE [DISTWIDTH] IN BLOOD BY AUTOMATED COUNT: 14.6 % (ref 10–15)
FERRITIN SERPL-MCNC: 14 NG/ML (ref 6–175)
HCT VFR BLD AUTO: 32 % (ref 35–47)
HGB BLD-MCNC: 9.5 G/DL (ref 11.7–15.7)
MCH RBC QN AUTO: 21.4 PG (ref 26.5–33)
MCHC RBC AUTO-ENTMCNC: 29.7 G/DL (ref 31.5–36.5)
MCV RBC AUTO: 72 FL (ref 78–100)
PLATELET # BLD AUTO: 386 10E3/UL (ref 150–450)
RBC # BLD AUTO: 4.43 10E6/UL (ref 3.8–5.2)
WBC # BLD AUTO: 6.9 10E3/UL (ref 4–11)

## 2023-04-20 PROCEDURE — 85027 COMPLETE CBC AUTOMATED: CPT | Performed by: NURSE PRACTITIONER

## 2023-04-20 PROCEDURE — 82728 ASSAY OF FERRITIN: CPT | Performed by: NURSE PRACTITIONER

## 2023-04-27 ENCOUNTER — ANCILLARY PROCEDURE (OUTPATIENT)
Dept: ULTRASOUND IMAGING | Facility: HOSPITAL | Age: 43
End: 2023-04-27
Attending: OBSTETRICS & GYNECOLOGY
Payer: COMMERCIAL

## 2023-04-27 ENCOUNTER — OFFICE VISIT (OUTPATIENT)
Dept: MATERNAL FETAL MEDICINE | Facility: HOSPITAL | Age: 43
End: 2023-04-27
Attending: OBSTETRICS & GYNECOLOGY
Payer: COMMERCIAL

## 2023-04-27 DIAGNOSIS — O09.292 HISTORY OF INTRAUTERINE FETAL DEATH, CURRENTLY PREGNANT IN SECOND TRIMESTER: ICD-10-CM

## 2023-04-27 DIAGNOSIS — D56.8 OTHER THALASSEMIA (H): ICD-10-CM

## 2023-04-27 DIAGNOSIS — O09.293 HISTORY OF INTRAUTERINE FETAL DEATH, CURRENTLY PREGNANT IN THIRD TRIMESTER: Primary | ICD-10-CM

## 2023-04-27 DIAGNOSIS — O36.8390 FETAL ARRHYTHMIA AFFECTING PREGNANCY, ANTEPARTUM: ICD-10-CM

## 2023-04-27 PROCEDURE — 76816 OB US FOLLOW-UP PER FETUS: CPT

## 2023-04-27 PROCEDURE — 76821 MIDDLE CEREBRAL ARTERY ECHO: CPT | Mod: 26 | Performed by: OBSTETRICS & GYNECOLOGY

## 2023-04-27 PROCEDURE — 76816 OB US FOLLOW-UP PER FETUS: CPT | Mod: 26 | Performed by: OBSTETRICS & GYNECOLOGY

## 2023-04-27 PROCEDURE — 99213 OFFICE O/P EST LOW 20 MIN: CPT | Mod: 25 | Performed by: OBSTETRICS & GYNECOLOGY

## 2023-05-25 ENCOUNTER — ANCILLARY PROCEDURE (OUTPATIENT)
Dept: ULTRASOUND IMAGING | Facility: HOSPITAL | Age: 43
End: 2023-05-25
Attending: OBSTETRICS & GYNECOLOGY
Payer: COMMERCIAL

## 2023-05-25 ENCOUNTER — OFFICE VISIT (OUTPATIENT)
Dept: MATERNAL FETAL MEDICINE | Facility: HOSPITAL | Age: 43
End: 2023-05-25
Attending: OBSTETRICS & GYNECOLOGY
Payer: COMMERCIAL

## 2023-05-25 DIAGNOSIS — O09.293 HISTORY OF INTRAUTERINE FETAL DEATH, CURRENTLY PREGNANT IN THIRD TRIMESTER: ICD-10-CM

## 2023-05-25 DIAGNOSIS — O09.523 ADVANCED MATERNAL AGE IN MULTIGRAVIDA, THIRD TRIMESTER: ICD-10-CM

## 2023-05-25 DIAGNOSIS — O36.8390 FETAL ARRHYTHMIA AFFECTING PREGNANCY, ANTEPARTUM: ICD-10-CM

## 2023-05-25 DIAGNOSIS — D56.8 OTHER THALASSEMIA (H): ICD-10-CM

## 2023-05-25 DIAGNOSIS — O09.293 HISTORY OF INTRAUTERINE FETAL DEATH, CURRENTLY PREGNANT IN THIRD TRIMESTER: Primary | ICD-10-CM

## 2023-05-25 PROCEDURE — 99213 OFFICE O/P EST LOW 20 MIN: CPT | Mod: 25 | Performed by: OBSTETRICS & GYNECOLOGY

## 2023-05-25 PROCEDURE — 76821 MIDDLE CEREBRAL ARTERY ECHO: CPT

## 2023-05-25 PROCEDURE — 76816 OB US FOLLOW-UP PER FETUS: CPT | Mod: 26 | Performed by: OBSTETRICS & GYNECOLOGY

## 2023-05-25 PROCEDURE — 76821 MIDDLE CEREBRAL ARTERY ECHO: CPT | Mod: 26 | Performed by: OBSTETRICS & GYNECOLOGY

## 2023-05-25 NOTE — NURSING NOTE
Patient reports positive fetal movement, denies pain, denies contractions, leaking of fluid, or bleeding. Patient denies headache, visual changes, nausea/vomiting, epigastric pain related to preeclampsia. [employer] ipad  used for LENA visit, ID #[id number]. SBAR given to VIJAY MD, see their note in Epic.

## 2023-06-08 ENCOUNTER — LAB REQUISITION (OUTPATIENT)
Dept: LAB | Facility: CLINIC | Age: 43
End: 2023-06-08
Payer: COMMERCIAL

## 2023-06-08 DIAGNOSIS — Z3A.36 36 WEEKS GESTATION OF PREGNANCY: ICD-10-CM

## 2023-06-08 PROCEDURE — 87653 STREP B DNA AMP PROBE: CPT | Mod: ORL | Performed by: STUDENT IN AN ORGANIZED HEALTH CARE EDUCATION/TRAINING PROGRAM

## 2023-06-10 LAB — GP B STREP DNA SPEC QL NAA+PROBE: NEGATIVE

## 2023-06-22 ENCOUNTER — OFFICE VISIT (OUTPATIENT)
Dept: MATERNAL FETAL MEDICINE | Facility: HOSPITAL | Age: 43
End: 2023-06-22
Attending: OBSTETRICS & GYNECOLOGY
Payer: COMMERCIAL

## 2023-06-22 ENCOUNTER — ANCILLARY PROCEDURE (OUTPATIENT)
Dept: ULTRASOUND IMAGING | Facility: HOSPITAL | Age: 43
End: 2023-06-22
Attending: OBSTETRICS & GYNECOLOGY
Payer: COMMERCIAL

## 2023-06-22 DIAGNOSIS — O09.293 HISTORY OF INTRAUTERINE FETAL DEATH, CURRENTLY PREGNANT IN THIRD TRIMESTER: ICD-10-CM

## 2023-06-22 DIAGNOSIS — O36.8390 FETAL ARRHYTHMIA AFFECTING PREGNANCY, ANTEPARTUM: Primary | ICD-10-CM

## 2023-06-22 DIAGNOSIS — O36.8390 FETAL ARRHYTHMIA AFFECTING PREGNANCY, ANTEPARTUM: ICD-10-CM

## 2023-06-22 PROCEDURE — 76816 OB US FOLLOW-UP PER FETUS: CPT

## 2023-06-22 PROCEDURE — 99213 OFFICE O/P EST LOW 20 MIN: CPT | Mod: 25 | Performed by: OBSTETRICS & GYNECOLOGY

## 2023-06-22 PROCEDURE — 76819 FETAL BIOPHYS PROFIL W/O NST: CPT | Mod: 26 | Performed by: OBSTETRICS & GYNECOLOGY

## 2023-06-22 PROCEDURE — 76816 OB US FOLLOW-UP PER FETUS: CPT | Mod: 26 | Performed by: OBSTETRICS & GYNECOLOGY

## 2023-06-22 NOTE — PROGRESS NOTES
Please see full imaging report from [employer] program under imaging tab.    Thank-you for referring your patient to assess fetal growth. I discussed the findings on today's ultrasound with the patient and her partner, with the assistance of an iPad .     Given the persistence of the PACs, I do recommend an EKG be done on the  after birth.     We reviewed the lagging proximal long bone growth, most likely constitutional given the short stature of both parents and the otherwise reassuring US.     They plan delivery in one week by C/S and so no further M follow up was scheduled.     Return to primary provider for continued prenatal care.    If you have questions regarding today's evaluation or if we can be of further service, please contact the Maternal-Fetal Medicine Center.     I spent a total of 15 minutes on the date of this encounter including preparing to see the patient (reviewing medical records/tests), counseling and discussing the plan of care, documenting the visit in the electronic medical record, and communicating with other health care professionals and/or care coordination.    Abdias Yip MD  Maternal Fetal Medicine

## 2023-06-22 NOTE — NURSING NOTE
Patient reports positive fetal movement, denies pain, denies contractions, leaking of fluid, or bleeding. Patient denies headache, visual changes, nausea/vomiting, epigastric pain related to preeclampsia. [employer] ipad  used for [facility]NA visit, ID# [id number].  SBAR given to VIJAY MD, see their note in Epic.

## 2023-06-29 ENCOUNTER — TRANSFERRED RECORDS (OUTPATIENT)
Dept: HEALTH INFORMATION MANAGEMENT | Facility: CLINIC | Age: 43
End: 2023-06-29

## 2023-06-29 ENCOUNTER — ANESTHESIA (OUTPATIENT)
Dept: OBGYN | Facility: HOSPITAL | Age: 43
End: 2023-06-29
Payer: COMMERCIAL

## 2023-06-29 ENCOUNTER — ANESTHESIA EVENT (OUTPATIENT)
Dept: OBGYN | Facility: HOSPITAL | Age: 43
End: 2023-06-29
Payer: COMMERCIAL

## 2023-06-29 ENCOUNTER — HOSPITAL ENCOUNTER (INPATIENT)
Facility: HOSPITAL | Age: 43
LOS: 2 days | Discharge: HOME | End: 2023-07-01
Attending: STUDENT IN AN ORGANIZED HEALTH CARE EDUCATION/TRAINING PROGRAM | Admitting: STUDENT IN AN ORGANIZED HEALTH CARE EDUCATION/TRAINING PROGRAM
Payer: COMMERCIAL

## 2023-06-29 DIAGNOSIS — Z98.891 S/P REPEAT LOW TRANSVERSE C-SECTION: ICD-10-CM

## 2023-06-29 LAB
ABO/RH(D): NORMAL
ANTIBODY SCREEN: NEGATIVE
HGB BLD-MCNC: 11 G/DL (ref 11.7–15.7)
HOLD SPECIMEN: NORMAL
HOLD SPECIMEN: NORMAL
SPECIMEN EXPIRATION DATE: NORMAL

## 2023-06-29 PROCEDURE — 258N000003 HC RX IP 258 OP 636: Performed by: NURSE PRACTITIONER

## 2023-06-29 PROCEDURE — 86901 BLOOD TYPING SEROLOGIC RH(D): CPT | Performed by: NURSE PRACTITIONER

## 2023-06-29 PROCEDURE — 120N000001 HC R&B MED SURG/OB

## 2023-06-29 PROCEDURE — 250N000011 HC RX IP 250 OP 636: Performed by: NURSE PRACTITIONER

## 2023-06-29 PROCEDURE — 250N000009 HC RX 250: Performed by: NURSE PRACTITIONER

## 2023-06-29 PROCEDURE — 250N000009 HC RX 250: Performed by: NURSE ANESTHETIST, CERTIFIED REGISTERED

## 2023-06-29 PROCEDURE — 250N000011 HC RX IP 250 OP 636: Mod: JZ | Performed by: STUDENT IN AN ORGANIZED HEALTH CARE EDUCATION/TRAINING PROGRAM

## 2023-06-29 PROCEDURE — 85018 HEMOGLOBIN: CPT | Performed by: NURSE PRACTITIONER

## 2023-06-29 PROCEDURE — C9290 INJ, BUPIVACAINE LIPOSOME: HCPCS | Performed by: ANESTHESIOLOGY

## 2023-06-29 PROCEDURE — 86780 TREPONEMA PALLIDUM: CPT | Performed by: NURSE PRACTITIONER

## 2023-06-29 PROCEDURE — 86850 RBC ANTIBODY SCREEN: CPT | Performed by: NURSE PRACTITIONER

## 2023-06-29 PROCEDURE — 360N000076 HC SURGERY LEVEL 3, PER MIN: Performed by: STUDENT IN AN ORGANIZED HEALTH CARE EDUCATION/TRAINING PROGRAM

## 2023-06-29 PROCEDURE — 250N000013 HC RX MED GY IP 250 OP 250 PS 637: Performed by: NURSE PRACTITIONER

## 2023-06-29 PROCEDURE — 272N000001 HC OR GENERAL SUPPLY STERILE: Performed by: STUDENT IN AN ORGANIZED HEALTH CARE EDUCATION/TRAINING PROGRAM

## 2023-06-29 PROCEDURE — 258N000003 HC RX IP 258 OP 636: Performed by: ANESTHESIOLOGY

## 2023-06-29 PROCEDURE — 250N000013 HC RX MED GY IP 250 OP 250 PS 637: Performed by: STUDENT IN AN ORGANIZED HEALTH CARE EDUCATION/TRAINING PROGRAM

## 2023-06-29 PROCEDURE — 88302 TISSUE EXAM BY PATHOLOGIST: CPT | Mod: TC | Performed by: STUDENT IN AN ORGANIZED HEALTH CARE EDUCATION/TRAINING PROGRAM

## 2023-06-29 PROCEDURE — 258N000003 HC RX IP 258 OP 636: Performed by: NURSE ANESTHETIST, CERTIFIED REGISTERED

## 2023-06-29 PROCEDURE — 250N000011 HC RX IP 250 OP 636: Mod: JZ | Performed by: NURSE ANESTHETIST, CERTIFIED REGISTERED

## 2023-06-29 PROCEDURE — 999N000249 HC STATISTIC C-SECTION ON UNIT

## 2023-06-29 PROCEDURE — 370N000017 HC ANESTHESIA TECHNICAL FEE, PER MIN: Performed by: STUDENT IN AN ORGANIZED HEALTH CARE EDUCATION/TRAINING PROGRAM

## 2023-06-29 PROCEDURE — 250N000011 HC RX IP 250 OP 636: Performed by: ANESTHESIOLOGY

## 2023-06-29 PROCEDURE — 999N000248 HC STATISTIC IV INSERT WITH US BY RN

## 2023-06-29 PROCEDURE — 88302 TISSUE EXAM BY PATHOLOGIST: CPT | Mod: 26 | Performed by: PATHOLOGY

## 2023-06-29 PROCEDURE — 0UT70ZZ RESECTION OF BILATERAL FALLOPIAN TUBES, OPEN APPROACH: ICD-10-PCS | Performed by: STUDENT IN AN ORGANIZED HEALTH CARE EDUCATION/TRAINING PROGRAM

## 2023-06-29 PROCEDURE — 36415 COLL VENOUS BLD VENIPUNCTURE: CPT | Performed by: NURSE PRACTITIONER

## 2023-06-29 PROCEDURE — 250N000013 HC RX MED GY IP 250 OP 250 PS 637: Performed by: ANESTHESIOLOGY

## 2023-06-29 RX ORDER — NALOXONE HYDROCHLORIDE 0.4 MG/ML
0.4 INJECTION, SOLUTION INTRAMUSCULAR; INTRAVENOUS; SUBCUTANEOUS
Status: DISCONTINUED | OUTPATIENT
Start: 2023-06-29 | End: 2023-07-01 | Stop reason: HOSPADM

## 2023-06-29 RX ORDER — ONDANSETRON 2 MG/ML
4 INJECTION INTRAMUSCULAR; INTRAVENOUS EVERY 30 MIN PRN
Status: DISCONTINUED | OUTPATIENT
Start: 2023-06-29 | End: 2023-07-01 | Stop reason: HOSPADM

## 2023-06-29 RX ORDER — FENTANYL CITRATE-0.9 % NACL/PF 10 MCG/ML
100 PLASTIC BAG, INJECTION (ML) INTRAVENOUS EVERY 5 MIN PRN
Status: DISCONTINUED | OUTPATIENT
Start: 2023-06-29 | End: 2023-06-29 | Stop reason: HOSPADM

## 2023-06-29 RX ORDER — BISACODYL 10 MG
10 SUPPOSITORY, RECTAL RECTAL DAILY PRN
Status: DISCONTINUED | OUTPATIENT
Start: 2023-07-01 | End: 2023-07-01 | Stop reason: HOSPADM

## 2023-06-29 RX ORDER — FENTANYL CITRATE 50 UG/ML
50 INJECTION, SOLUTION INTRAMUSCULAR; INTRAVENOUS EVERY 5 MIN PRN
Status: DISCONTINUED | OUTPATIENT
Start: 2023-06-29 | End: 2023-07-01 | Stop reason: HOSPADM

## 2023-06-29 RX ORDER — METHYLERGONOVINE MALEATE 0.2 MG/ML
200 INJECTION INTRAVENOUS
Status: DISCONTINUED | OUTPATIENT
Start: 2023-06-29 | End: 2023-06-29 | Stop reason: HOSPADM

## 2023-06-29 RX ORDER — CEFAZOLIN SODIUM/WATER 2 G/20 ML
2 SYRINGE (ML) INTRAVENOUS
Status: COMPLETED | OUTPATIENT
Start: 2023-06-29 | End: 2023-06-29

## 2023-06-29 RX ORDER — AMOXICILLIN 250 MG
2 CAPSULE ORAL 2 TIMES DAILY
Status: DISCONTINUED | OUTPATIENT
Start: 2023-06-29 | End: 2023-07-01 | Stop reason: HOSPADM

## 2023-06-29 RX ORDER — NALOXONE HYDROCHLORIDE 0.4 MG/ML
0.2 INJECTION, SOLUTION INTRAMUSCULAR; INTRAVENOUS; SUBCUTANEOUS
Status: DISCONTINUED | OUTPATIENT
Start: 2023-06-29 | End: 2023-07-01 | Stop reason: HOSPADM

## 2023-06-29 RX ORDER — OXYTOCIN/0.9 % SODIUM CHLORIDE 30/500 ML
100-340 PLASTIC BAG, INJECTION (ML) INTRAVENOUS CONTINUOUS PRN
Status: DISCONTINUED | OUTPATIENT
Start: 2023-06-29 | End: 2023-07-01 | Stop reason: HOSPADM

## 2023-06-29 RX ORDER — MISOPROSTOL 200 UG/1
800 TABLET ORAL
Status: DISCONTINUED | OUTPATIENT
Start: 2023-06-29 | End: 2023-06-29 | Stop reason: HOSPADM

## 2023-06-29 RX ORDER — HYDROMORPHONE HCL IN WATER/PF 6 MG/30 ML
0.4 PATIENT CONTROLLED ANALGESIA SYRINGE INTRAVENOUS EVERY 5 MIN PRN
Status: DISCONTINUED | OUTPATIENT
Start: 2023-06-29 | End: 2023-07-01 | Stop reason: HOSPADM

## 2023-06-29 RX ORDER — FERROUS GLUCONATE 324(38)MG
324 TABLET ORAL
COMMUNITY
End: 2023-09-26

## 2023-06-29 RX ORDER — DEXTROSE, SODIUM CHLORIDE, SODIUM LACTATE, POTASSIUM CHLORIDE, AND CALCIUM CHLORIDE 5; .6; .31; .03; .02 G/100ML; G/100ML; G/100ML; G/100ML; G/100ML
INJECTION, SOLUTION INTRAVENOUS CONTINUOUS
Status: DISCONTINUED | OUTPATIENT
Start: 2023-06-29 | End: 2023-07-01 | Stop reason: HOSPADM

## 2023-06-29 RX ORDER — ONDANSETRON 4 MG/1
4 TABLET, ORALLY DISINTEGRATING ORAL EVERY 30 MIN PRN
Status: DISCONTINUED | OUTPATIENT
Start: 2023-06-29 | End: 2023-07-01 | Stop reason: HOSPADM

## 2023-06-29 RX ORDER — METOCLOPRAMIDE 10 MG/1
10 TABLET ORAL EVERY 6 HOURS PRN
Status: DISCONTINUED | OUTPATIENT
Start: 2023-06-29 | End: 2023-07-01 | Stop reason: HOSPADM

## 2023-06-29 RX ORDER — SODIUM CHLORIDE, SODIUM LACTATE, POTASSIUM CHLORIDE, CALCIUM CHLORIDE 600; 310; 30; 20 MG/100ML; MG/100ML; MG/100ML; MG/100ML
INJECTION, SOLUTION INTRAVENOUS CONTINUOUS
Status: DISCONTINUED | OUTPATIENT
Start: 2023-06-29 | End: 2023-06-29 | Stop reason: HOSPADM

## 2023-06-29 RX ORDER — SODIUM CHLORIDE, SODIUM LACTATE, POTASSIUM CHLORIDE, CALCIUM CHLORIDE 600; 310; 30; 20 MG/100ML; MG/100ML; MG/100ML; MG/100ML
INJECTION, SOLUTION INTRAVENOUS CONTINUOUS
Status: DISCONTINUED | OUTPATIENT
Start: 2023-06-29 | End: 2023-07-01 | Stop reason: HOSPADM

## 2023-06-29 RX ORDER — ACETAMINOPHEN 325 MG/1
975 TABLET ORAL ONCE
Status: COMPLETED | OUTPATIENT
Start: 2023-06-29 | End: 2023-06-29

## 2023-06-29 RX ORDER — MORPHINE SULFATE 0.5 MG/ML
150 INJECTION, SOLUTION EPIDURAL; INTRATHECAL; INTRAVENOUS ONCE
Status: DISCONTINUED | OUTPATIENT
Start: 2023-06-29 | End: 2023-06-29 | Stop reason: HOSPADM

## 2023-06-29 RX ORDER — NALBUPHINE HYDROCHLORIDE 20 MG/ML
2.5-5 INJECTION, SOLUTION INTRAMUSCULAR; INTRAVENOUS; SUBCUTANEOUS EVERY 6 HOURS PRN
Status: DISCONTINUED | OUTPATIENT
Start: 2023-06-29 | End: 2023-07-01 | Stop reason: HOSPADM

## 2023-06-29 RX ORDER — BUPIVACAINE HYDROCHLORIDE 7.5 MG/ML
INJECTION, SOLUTION INTRASPINAL
Status: COMPLETED | OUTPATIENT
Start: 2023-06-29 | End: 2023-06-29

## 2023-06-29 RX ORDER — OXYTOCIN/0.9 % SODIUM CHLORIDE 30/500 ML
PLASTIC BAG, INJECTION (ML) INTRAVENOUS CONTINUOUS PRN
Status: DISCONTINUED | OUTPATIENT
Start: 2023-06-29 | End: 2023-06-29

## 2023-06-29 RX ORDER — MORPHINE SULFATE 1 MG/ML
INJECTION, SOLUTION EPIDURAL; INTRATHECAL; INTRAVENOUS
Status: COMPLETED | OUTPATIENT
Start: 2023-06-29 | End: 2023-06-29

## 2023-06-29 RX ORDER — BUPIVACAINE HYDROCHLORIDE 2.5 MG/ML
INJECTION, SOLUTION EPIDURAL; INFILTRATION; INTRACAUDAL
Status: COMPLETED | OUTPATIENT
Start: 2023-06-29 | End: 2023-06-29

## 2023-06-29 RX ORDER — METHYLERGONOVINE MALEATE 0.2 MG/ML
200 INJECTION INTRAVENOUS
Status: DISCONTINUED | OUTPATIENT
Start: 2023-06-29 | End: 2023-07-01 | Stop reason: HOSPADM

## 2023-06-29 RX ORDER — HYDROCORTISONE 25 MG/G
CREAM TOPICAL 3 TIMES DAILY PRN
Status: DISCONTINUED | OUTPATIENT
Start: 2023-06-29 | End: 2023-07-01 | Stop reason: HOSPADM

## 2023-06-29 RX ORDER — CEFAZOLIN SODIUM/WATER 2 G/20 ML
2 SYRINGE (ML) INTRAVENOUS SEE ADMIN INSTRUCTIONS
Status: DISCONTINUED | OUTPATIENT
Start: 2023-06-29 | End: 2023-06-29 | Stop reason: HOSPADM

## 2023-06-29 RX ORDER — OXYCODONE HYDROCHLORIDE 5 MG/1
5 TABLET ORAL EVERY 4 HOURS PRN
Status: DISCONTINUED | OUTPATIENT
Start: 2023-06-29 | End: 2023-07-01 | Stop reason: HOSPADM

## 2023-06-29 RX ORDER — PROCHLORPERAZINE MALEATE 10 MG
10 TABLET ORAL EVERY 6 HOURS PRN
Status: DISCONTINUED | OUTPATIENT
Start: 2023-06-29 | End: 2023-07-01 | Stop reason: HOSPADM

## 2023-06-29 RX ORDER — OXYTOCIN 10 [USP'U]/ML
10 INJECTION, SOLUTION INTRAMUSCULAR; INTRAVENOUS
Status: DISCONTINUED | OUTPATIENT
Start: 2023-06-29 | End: 2023-07-01 | Stop reason: HOSPADM

## 2023-06-29 RX ORDER — MISOPROSTOL 200 UG/1
400 TABLET ORAL
Status: DISCONTINUED | OUTPATIENT
Start: 2023-06-29 | End: 2023-06-29 | Stop reason: HOSPADM

## 2023-06-29 RX ORDER — OXYTOCIN/0.9 % SODIUM CHLORIDE 30/500 ML
340 PLASTIC BAG, INJECTION (ML) INTRAVENOUS CONTINUOUS PRN
Status: DISCONTINUED | OUTPATIENT
Start: 2023-06-29 | End: 2023-07-01 | Stop reason: HOSPADM

## 2023-06-29 RX ORDER — OXYTOCIN/0.9 % SODIUM CHLORIDE 30/500 ML
340 PLASTIC BAG, INJECTION (ML) INTRAVENOUS CONTINUOUS PRN
Status: DISCONTINUED | OUTPATIENT
Start: 2023-06-29 | End: 2023-06-29 | Stop reason: HOSPADM

## 2023-06-29 RX ORDER — PROCHLORPERAZINE 25 MG
25 SUPPOSITORY, RECTAL RECTAL EVERY 12 HOURS PRN
Status: DISCONTINUED | OUTPATIENT
Start: 2023-06-29 | End: 2023-07-01 | Stop reason: HOSPADM

## 2023-06-29 RX ORDER — CITRIC ACID/SODIUM CITRATE 334-500MG
30 SOLUTION, ORAL ORAL
Status: DISCONTINUED | OUTPATIENT
Start: 2023-06-29 | End: 2023-06-29 | Stop reason: HOSPADM

## 2023-06-29 RX ORDER — LIDOCAINE 40 MG/G
CREAM TOPICAL
Status: DISCONTINUED | OUTPATIENT
Start: 2023-06-29 | End: 2023-07-01 | Stop reason: HOSPADM

## 2023-06-29 RX ORDER — AMOXICILLIN 250 MG
1 CAPSULE ORAL 2 TIMES DAILY
Status: DISCONTINUED | OUTPATIENT
Start: 2023-06-29 | End: 2023-07-01 | Stop reason: HOSPADM

## 2023-06-29 RX ORDER — KETOROLAC TROMETHAMINE 30 MG/ML
30 INJECTION, SOLUTION INTRAMUSCULAR; INTRAVENOUS EVERY 6 HOURS
Status: COMPLETED | OUTPATIENT
Start: 2023-06-29 | End: 2023-06-30

## 2023-06-29 RX ORDER — OXYTOCIN 10 [USP'U]/ML
10 INJECTION, SOLUTION INTRAMUSCULAR; INTRAVENOUS
Status: DISCONTINUED | OUTPATIENT
Start: 2023-06-29 | End: 2023-06-29 | Stop reason: HOSPADM

## 2023-06-29 RX ORDER — EPHEDRINE SULFATE 50 MG/ML
INJECTION, SOLUTION INTRAMUSCULAR; INTRAVENOUS; SUBCUTANEOUS PRN
Status: DISCONTINUED | OUTPATIENT
Start: 2023-06-29 | End: 2023-06-29

## 2023-06-29 RX ORDER — METOCLOPRAMIDE HYDROCHLORIDE 5 MG/ML
10 INJECTION INTRAMUSCULAR; INTRAVENOUS EVERY 6 HOURS PRN
Status: DISCONTINUED | OUTPATIENT
Start: 2023-06-29 | End: 2023-07-01 | Stop reason: HOSPADM

## 2023-06-29 RX ORDER — MISOPROSTOL 200 UG/1
400 TABLET ORAL
Status: DISCONTINUED | OUTPATIENT
Start: 2023-06-29 | End: 2023-07-01 | Stop reason: HOSPADM

## 2023-06-29 RX ORDER — CARBOPROST TROMETHAMINE 250 UG/ML
250 INJECTION, SOLUTION INTRAMUSCULAR
Status: DISCONTINUED | OUTPATIENT
Start: 2023-06-29 | End: 2023-06-29 | Stop reason: HOSPADM

## 2023-06-29 RX ORDER — CITRIC ACID/SODIUM CITRATE 334-500MG
15 SOLUTION, ORAL ORAL
Status: COMPLETED | OUTPATIENT
Start: 2023-06-29 | End: 2023-06-29

## 2023-06-29 RX ORDER — ONDANSETRON 4 MG/1
4 TABLET, ORALLY DISINTEGRATING ORAL EVERY 6 HOURS PRN
Status: DISCONTINUED | OUTPATIENT
Start: 2023-06-29 | End: 2023-07-01 | Stop reason: HOSPADM

## 2023-06-29 RX ORDER — IBUPROFEN 800 MG/1
800 TABLET, FILM COATED ORAL EVERY 6 HOURS
Status: DISCONTINUED | OUTPATIENT
Start: 2023-06-30 | End: 2023-07-01 | Stop reason: HOSPADM

## 2023-06-29 RX ORDER — DIPHENHYDRAMINE HYDROCHLORIDE 50 MG/ML
25 INJECTION INTRAMUSCULAR; INTRAVENOUS EVERY 6 HOURS PRN
Status: DISCONTINUED | OUTPATIENT
Start: 2023-06-29 | End: 2023-07-01 | Stop reason: HOSPADM

## 2023-06-29 RX ORDER — DIPHENHYDRAMINE HCL 25 MG
25 CAPSULE ORAL EVERY 6 HOURS PRN
Status: DISCONTINUED | OUTPATIENT
Start: 2023-06-29 | End: 2023-07-01 | Stop reason: HOSPADM

## 2023-06-29 RX ORDER — SIMETHICONE 80 MG
80 TABLET,CHEWABLE ORAL 4 TIMES DAILY PRN
Status: DISCONTINUED | OUTPATIENT
Start: 2023-06-29 | End: 2023-07-01 | Stop reason: HOSPADM

## 2023-06-29 RX ORDER — ONDANSETRON 2 MG/ML
INJECTION INTRAMUSCULAR; INTRAVENOUS PRN
Status: DISCONTINUED | OUTPATIENT
Start: 2023-06-29 | End: 2023-06-29

## 2023-06-29 RX ORDER — LIDOCAINE 40 MG/G
CREAM TOPICAL
Status: DISCONTINUED | OUTPATIENT
Start: 2023-06-29 | End: 2023-06-29 | Stop reason: HOSPADM

## 2023-06-29 RX ORDER — CARBOPROST TROMETHAMINE 250 UG/ML
250 INJECTION, SOLUTION INTRAMUSCULAR
Status: DISCONTINUED | OUTPATIENT
Start: 2023-06-29 | End: 2023-07-01 | Stop reason: HOSPADM

## 2023-06-29 RX ORDER — MISOPROSTOL 200 UG/1
800 TABLET ORAL
Status: DISCONTINUED | OUTPATIENT
Start: 2023-06-29 | End: 2023-07-01 | Stop reason: HOSPADM

## 2023-06-29 RX ORDER — ONDANSETRON 2 MG/ML
4 INJECTION INTRAMUSCULAR; INTRAVENOUS EVERY 6 HOURS PRN
Status: DISCONTINUED | OUTPATIENT
Start: 2023-06-29 | End: 2023-07-01 | Stop reason: HOSPADM

## 2023-06-29 RX ORDER — ACETAMINOPHEN 325 MG/1
975 TABLET ORAL EVERY 6 HOURS
Status: DISCONTINUED | OUTPATIENT
Start: 2023-06-29 | End: 2023-07-01 | Stop reason: HOSPADM

## 2023-06-29 RX ORDER — KETOROLAC TROMETHAMINE 30 MG/ML
INJECTION, SOLUTION INTRAMUSCULAR; INTRAVENOUS PRN
Status: DISCONTINUED | OUTPATIENT
Start: 2023-06-29 | End: 2023-06-29

## 2023-06-29 RX ORDER — HYDROMORPHONE HCL IN WATER/PF 6 MG/30 ML
0.2 PATIENT CONTROLLED ANALGESIA SYRINGE INTRAVENOUS EVERY 5 MIN PRN
Status: DISCONTINUED | OUTPATIENT
Start: 2023-06-29 | End: 2023-07-01 | Stop reason: HOSPADM

## 2023-06-29 RX ORDER — FENTANYL CITRATE 50 UG/ML
25 INJECTION, SOLUTION INTRAMUSCULAR; INTRAVENOUS EVERY 5 MIN PRN
Status: DISCONTINUED | OUTPATIENT
Start: 2023-06-29 | End: 2023-07-01 | Stop reason: HOSPADM

## 2023-06-29 RX ORDER — MODIFIED LANOLIN
OINTMENT (GRAM) TOPICAL
Status: DISCONTINUED | OUTPATIENT
Start: 2023-06-29 | End: 2023-07-01 | Stop reason: HOSPADM

## 2023-06-29 RX ADMIN — SODIUM CITRATE AND CITRIC ACID MONOHYDRATE 15 ML: 500; 334 SOLUTION ORAL at 12:49

## 2023-06-29 RX ADMIN — Medication 5 MG: at 13:51

## 2023-06-29 RX ADMIN — TRANEXAMIC ACID 1 G: 1 INJECTION, SOLUTION INTRAVENOUS at 13:07

## 2023-06-29 RX ADMIN — BUPIVACAINE 10 ML: 13.3 INJECTION, SUSPENSION, LIPOSOMAL INFILTRATION at 14:23

## 2023-06-29 RX ADMIN — BUPIVACAINE HYDROCHLORIDE 10 ML: 2.5 INJECTION, SOLUTION EPIDURAL; INFILTRATION; INTRACAUDAL at 14:23

## 2023-06-29 RX ADMIN — Medication 5 MG: at 13:40

## 2023-06-29 RX ADMIN — Medication 5 MG: at 13:33

## 2023-06-29 RX ADMIN — Medication 2 G: at 13:11

## 2023-06-29 RX ADMIN — PHENYLEPHRINE HYDROCHLORIDE 100 MCG: 10 INJECTION INTRAVENOUS at 14:02

## 2023-06-29 RX ADMIN — Medication 300 ML/HR: at 13:40

## 2023-06-29 RX ADMIN — BUPIVACAINE 10 ML: 13.3 INJECTION, SUSPENSION, LIPOSOMAL INFILTRATION at 14:25

## 2023-06-29 RX ADMIN — PHENYLEPHRINE HYDROCHLORIDE 100 MCG: 10 INJECTION INTRAVENOUS at 13:58

## 2023-06-29 RX ADMIN — SENNOSIDES AND DOCUSATE SODIUM 1 TABLET: 50; 8.6 TABLET ORAL at 22:01

## 2023-06-29 RX ADMIN — BUPIVACAINE HYDROCHLORIDE 10 ML: 2.5 INJECTION, SOLUTION EPIDURAL; INFILTRATION; INTRACAUDAL at 14:25

## 2023-06-29 RX ADMIN — ONDANSETRON 4 MG: 2 INJECTION INTRAMUSCULAR; INTRAVENOUS at 13:44

## 2023-06-29 RX ADMIN — Medication 5 MG: at 13:26

## 2023-06-29 RX ADMIN — BUPIVACAINE HYDROCHLORIDE IN DEXTROSE 1.4 ML: 7.5 INJECTION, SOLUTION SUBARACHNOID at 13:15

## 2023-06-29 RX ADMIN — ACETAMINOPHEN 975 MG: 325 TABLET ORAL at 12:49

## 2023-06-29 RX ADMIN — KETOROLAC TROMETHAMINE 30 MG: 30 INJECTION, SOLUTION INTRAMUSCULAR at 14:15

## 2023-06-29 RX ADMIN — Medication 0.15 MG: at 13:15

## 2023-06-29 RX ADMIN — PHENYLEPHRINE HYDROCHLORIDE 0.3 MCG/KG/MIN: 10 INJECTION INTRAVENOUS at 13:21

## 2023-06-29 RX ADMIN — SODIUM CHLORIDE, POTASSIUM CHLORIDE, SODIUM LACTATE AND CALCIUM CHLORIDE: 600; 310; 30; 20 INJECTION, SOLUTION INTRAVENOUS at 11:53

## 2023-06-29 RX ADMIN — KETOROLAC TROMETHAMINE 30 MG: 30 INJECTION, SOLUTION INTRAMUSCULAR; INTRAVENOUS at 22:01

## 2023-06-29 RX ADMIN — Medication 5 MG: at 13:21

## 2023-06-29 ASSESSMENT — ACTIVITIES OF DAILY LIVING (ADL)
ADLS_ACUITY_SCORE: 18
CONCENTRATING,_REMEMBERING_OR_MAKING_DECISIONS_DIFFICULTY: NO
CHANGE_IN_FUNCTIONAL_STATUS_SINCE_ONSET_OF_CURRENT_ILLNESS/INJURY: NO
WALKING_OR_CLIMBING_STAIRS_DIFFICULTY: NO
WEAR_GLASSES_OR_BLIND: NO
FALL_HISTORY_WITHIN_LAST_SIX_MONTHS: NO
DRESSING/BATHING_DIFFICULTY: NO
DIFFICULTY_EATING/SWALLOWING: NO
ADLS_ACUITY_SCORE: 18
ADLS_ACUITY_SCORE: 18
TOILETING_ISSUES: NO
ADLS_ACUITY_SCORE: 18
DOING_ERRANDS_INDEPENDENTLY_DIFFICULTY: NO
ADLS_ACUITY_SCORE: 33
ADLS_ACUITY_SCORE: 18
ADLS_ACUITY_SCORE: 18

## 2023-06-29 NOTE — PROGRESS NOTES
Data: Patient admitted to room 07 at 1105. Patient is a . Prenatal record reviewed.   OB History    Para Term  AB Living   11 7 5 2 3 4   SAB IAB Ectopic Multiple Live Births   3 0 0 0 4      # Outcome Date GA Lbr Lexa/2nd Weight Sex Delivery Anes PTL Lv   11 Current            10  11 35w1d   F CS-LTranv   FRANK      Birth Comments: Unsuccessful TOLAC induction for hydrops, so C-sxn done      Name: Celeste   9 Term 09 39w0d  3.062 kg (6 lb 12 oz) F    FRANK      Name: Alla   8 Term 08 40w0d  3.487 kg (7 lb 11 oz) M    FRANK      Name: Dewey   7 SAB  4w0d          6   33w5d   M    FD      Birth Comments: IUFD, hydrops   5 Term  40w0d  3.629 kg (8 lb) M    FRANK      Name: Rony   4 SAB  8w0d          3 SAB  8w0d          2 Term  40w0d   F CS-Unspec   FD      Birth Comments: IUFD, hydrops   1 Term            .  Medical History: History reviewed. No pertinent past medical history..  Gestational age 38w6d. Vital signs per doc flowsheet. Fetal movement present. Patient reports Scheduled  Section  as reason for admission. Support persons Bee present.  Action:  Care of patient assumed upon arrival. Verbal consent for EFM, external fetal monitors applied. Admission assessment completed. Patient and support persons educated on labor process. Patient instructed to report change in fetal movement, contractions, vaginal leaking of fluid or bleeding, abdominal pain, or any concerns related to the pregnancy to her nurse/physician. Patient oriented to room, call light in reach.

## 2023-06-29 NOTE — ANESTHESIA PROCEDURE NOTES
TAP Procedure Note    Pre-Procedure   Staff -        Anesthesiologist:  Rocio Marcum MD       Performed By: anesthesiologist       Location: OR       Procedure Start/Stop Times: 6/29/2023 2:20 PM and 6/29/2023 2:25 PM       Pre-Anesthestic Checklist: patient identified, IV checked, site marked, risks and benefits discussed, informed consent, monitors and equipment checked, pre-op evaluation, at physician/surgeon's request and post-op pain management  Timeout:       Correct Patient: Yes        Correct Procedure: Yes        Correct Site: Yes        Correct Position: Yes        Correct Laterality: Yes        Site Marked: Yes  Procedure Documentation  Procedure: TAP       Laterality: bilateral       Patient Position: supine       Patient Prep/Sterile Barriers: sterile gloves, mask       Skin prep: Chloraprep       Needle Type: other (Stimuplex echogenic)       Needle Gauge: 20.        Needle Length (Inches): 4        Ultrasound guided       1. Ultrasound was used to identify targeted nerve, plexus, vascular marker, or fascial plane and place a needle adjacent to it in real-time.       2. Ultrasound was used to visualize the spread of anesthetic in close proximity to the above referenced structure.       3. A permanent image is entered into the patient's record.       4. The visualized anatomic structures appeared normal.       5. There were no apparent abnormal pathologic findings.    Assessment/Narrative         The placement was negative for: blood aspirated, painful injection and site bleeding       Paresthesias: No.       Bolus given via needle..        Secured via.        Insertion/Infusion Method: Single Shot       Complications: none       Injection made incrementally with aspirations every 5 mL.    Medication(s) Administered   Bupivacaine 0.25% PF (Infiltration) - Infiltration   10 mL - 6/29/2023 2:23:00 PM   10 mL - 6/29/2023 2:25:00 PM  Bupivacaine liposome (Exparel) 1.3% LA inj susp (Infiltration) -  "Infiltration   10 mL - 6/29/2023 2:23:00 PM   10 mL - 6/29/2023 2:25:00 PM  Medication Administration Time: 6/29/2023 2:20 PM      FOR [facility] ONLY:   Pain Team Contact information: please page the Pain Team Via [employer]. Search \"Pain\". During daytime hours, please page the attending first. At night please page the resident first.      "

## 2023-06-29 NOTE — ANESTHESIA PROCEDURE NOTES
"Intrathecal injection Procedure Note    Pre-Procedure   Staff -        Anesthesiologist:  Rocio Marcum MD       Performed By: anesthesiologist       Location: OR       Procedure Start/Stop Times: 6/29/2023 1:11 PM and 6/29/2023 1:15 PM       Pre-Anesthestic Checklist: patient identified, IV checked, risks and benefits discussed, informed consent, monitors and equipment checked, pre-op evaluation, at physician/surgeon's request and post-op pain management  Timeout:       Correct Patient: Yes        Correct Procedure: Yes        Correct Site: Yes        Correct Position: Yes   Procedure Documentation  Procedure: intrathecal injection       Patient Position: sitting       Patient Prep/Sterile Barriers: sterile gloves, mask, patient draped       Skin prep: Chloraprep       Insertion Site: L3-4. (midline approach).       Needle Length (Inches): 3.5        Spinal Needle Type: Pencan       Introducer used       Introducer: 20 G       # of attempts: 1 and  # of redirects:     Assessment/Narrative         Paresthesias: No.       Sensory Level: T4       CSF fluid: clear.    Medication(s) Administered   0.75% Hyperbaric Bupivacaine (Intrathecal) - Intrathecal   1.4 mL - 6/29/2023 1:15:00 PM  Morphine PF 1 mg/mL (Intrathecal) - Intrathecal   0.15 mg - 6/29/2023 1:15:00 PM  Medication Administration Time: 6/29/2023 1:11 PM      FOR [facility] ([facility] [place] ONLY:   Pain Team Contact information: please page the Pain Team Via [employer]. Search \"Pain\". During daytime hours, please page the attending first. At night please page the resident first.      "

## 2023-06-29 NOTE — PLAN OF CARE
Problem: Labor  Goal: Effective Progression to Delivery  Outcome: Met   Goal Outcome Evaluation:     Nichelle gave birth via repeat C/S at 1339 to a healthy infant boy.

## 2023-06-29 NOTE — OP NOTE
NAME:  Nichelle Soriano     RECORD # [id number]     [facility] # [id number]    DATE OF SERVICE: 2023     PREOPERATIVE DIAGNOSIS:   1. IUP at 38 6/7  2. History of  x 2 desiring repeat  3. Multiparity desiring sterilization  4. H/o IUFD x 2, hydrops due to alpha thalassemia    POSTOPERATIVE DIAGNOSIS: same, s/p repeat  with bilateral salpingectomy    PROCEDURE: Low transverse  section      SURGEON:  Meaghan Gabriel MD     ASSISTANT: resident    ANESTHESIA: spinal    FINDINGS: Normal uterus, tubes and ovaries bilateral. Normal appearance to the adnexae.  Live male infant born with Position OP.    SPECIMENS: Placenta manually and completely delivered intact with a 3 vessel cord    ESTIMATED BLOOD LOSS: Delivery QBL (mL): 793     DRAINS: Ruiz catheter.    COMPLICATIONS: None    DISPOSITION: Stable to recovery.    PREOPERATIVE CONSENT: The risks of the procedure were discussed with the patient including but not exclusive to bleeding and a risk of transfusion, infection, and damage to nearby organs, including the bladder, ureter and bowel.    PROCEDURE:  After obtaining informed consent, the patient was taken to the operating room in stable condition.  After induction of a spinal anesthetic, fetal heart tones were checked and were stable.  She was prepped and draped in the usual sterile fashion and positioned in the dorsal supine position.   A timeout was then performed.      A pfannensteil skin incision was made to the level of the fascia.  The fascia was incised in the midline and extended laterally using danielson scissors. Kocher clamps were applied to the superior aspect of the fascial incision and the underlying rectus muscles were dissected using blunt and sharp dissection with the danielson scissors.  The kocher clamps were then reapplied to the inferior  aspect of the fascial incision which was similarly sharply dissected from the rectus muscles.  The rectus muscles were  bluntly in the  midline.  The peritoneum was identified and entered bluntly with the surgeons finger.  The peritoneal incision was extended laterally using manual traction.  The Zac-O retractor was placed in the incision.      The bladder was identified and was noted to be away from the intended hysterotomy.  A low transverse incision was created sharply with the scalpel and extended using cephalocaudal traction.  The fetal head was delivered.  The fetus was fully delivered and the cord was clamped and cut.  The  was handed off to the awaiting pediatric team.  The placenta was manually and completely delivered intact with a 3 vessel cord.  The uterus was removed from the abdomen.  The uterus was cleared of all clots and debris.  The uterine incision was closed with 0-vicryl in a running locked fashion.  Hysterotomy was hemostatic with a single layer closure.  The pericolic gutters were cleared of all clots and debris.  The uterine incision was again inspected and noted to be hemostatic.      Attention then turned to bilateral tubal ligation portion of procedure. Uterus exteriorized. First on the left side the fallopian tube was identified and followed out to the fimbriated end.  The tube was grasped with destin clamp.  The ligasure device was used to serially coagulate and cut along the mesosalpinx to the level of the cornua.  The tube was then coagulated and transected with the ligasure device, the tube was removed and sent for pathology.  The same procedure was then repeated on the right side.  Surgical sites hemostatic.  The uterus was replaced to the abdomen.  The hysterotomy was inspected once back in its normal anatomic position and remained hemostatic.  The Zac retractor was then removed.    The fascia was reapproximated in a running stitch of 0-vicryl. The skin was closed with 4-0 monocryl in a subcuticular fashion.  The incision was dressed with exophin.  Patient tolerated this procedure well.  Sponge, lap  and needle counts were correct x two.  The patient was taken to the recovery room in stable condition.      Meaghan Gabriel MD

## 2023-06-29 NOTE — PROGRESS NOTES
Nichelle is resting comfortably in bed. 4 family members present and attentive to both patient and  Floyd. Ruiz catheter was removed, and she ambulated to bathroom with standby assist.  She did not feel the urge to void. Linens and pads changed and weighed. She denies experiencing any pain, though she does feel lightheaded at times. Encouraged to eat and drink as she is able. VSS. Lochia scant to light, fundus firm at the U.

## 2023-06-29 NOTE — ANESTHESIA CARE TRANSFER NOTE
Patient: Nichelle Soriano    Procedure: Procedure(s):  REPEAT  SECTION WITH BILATERAL SALPINGECTOMY       Diagnosis: History of  section [Z98.891]  Previous stillbirth or demise, antepartum [O09.299]  Request for sterilization [Z30.2]  Diagnosis Additional Information: No value filed.    Anesthesia Type:   Spinal     Note:    Oropharynx: oropharynx clear of all foreign objects  Level of Consciousness: awake  Oxygen Supplementation: room air    Independent Airway: airway patency satisfactory and stable  Dentition: dentition unchanged  Vital Signs Stable: post-procedure vital signs reviewed and stable  Report to RN Given: handoff report given  Patient transferred to: Labor and Delivery    Handoff Report: Identifed the Patient, Identified the Reponsible Provider, Reviewed the pertinent medical history, Discussed the surgical course, Reviewed Intra-OP anesthesia mangement and issues during anesthesia, Set expectations for post-procedure period and Allowed opportunity for questions and acknowledgement of understanding      Vitals:  Vitals Value Taken Time   /56 23 1439   Temp 36.4  C (97.6  F) 23 1439   Pulse 74 23 1439   Resp 16 23 1439   SpO2 97 % 23 1439       Electronically Signed By: Maryann Hagen CRNA, APRN CRNA  2023  2:39 PM

## 2023-06-29 NOTE — ANESTHESIA PREPROCEDURE EVALUATION
Anesthesia Pre-Procedure Evaluation    Patient: Nichelle Soriano   MRN: [id number] : 1980        Procedure : Procedure(s):  REPEAT  SECTION WITH BILATERAL SALPINGECTOMY          History reviewed. No pertinent past medical history.   Past Surgical History:   Procedure Laterality Date      SECTION        Allergies   Allergen Reactions     No Known Drug Allergy Unknown      Social History     Tobacco Use     Smoking status: Never     Smokeless tobacco: Never     Tobacco comments:     no passive exposure   Substance Use Topics     Alcohol use: No      Wt Readings from Last 1 Encounters:   22 64.1 kg (141 lb 4 oz)        Anesthesia Evaluation   Pt has had prior anesthetic.     No history of anesthetic complications       ROS/MED HX  ENT/Pulmonary:  - neg pulmonary ROS     Neurologic:  - neg neurologic ROS     Cardiovascular:       METS/Exercise Tolerance: >4 METS    Hematologic: Comments: Thalassemia      (+) anemia,     Musculoskeletal:  - neg musculoskeletal ROS     GI/Hepatic:  - neg GI/hepatic ROS     Renal/Genitourinary:  - neg Renal ROS     Endo:  - neg endo ROS     Psychiatric/Substance Use:       Infectious Disease:       Malignancy:       Other:      (+) Possibly pregnant, ,         Physical Exam    Airway        Mallampati: II   TM distance: > 3 FB   Neck ROM: full   Mouth opening: > 3 cm    Respiratory Devices and Support         Dental     Comment: Good        Cardiovascular   cardiovascular exam normal          Pulmonary   pulmonary exam normal                OUTSIDE LABS:  CBC:   Lab Results   Component Value Date    WBC 6.9 2023    WBC 7.3 2022    HGB 11.0 (L) 2023    HGB 9.5 (L) 2023    HCT 32.0 (L) 2023    HCT 36.4 2022     2023     2022     BMP:   Lab Results   Component Value Date     2019     2019    POTASSIUM 3.8 2019    POTASSIUM 3.8 2019    CHLORIDE 106 2019     CHLORIDE 107 05/16/2019    CO2 26 09/04/2019    CO2 24 05/16/2019    BUN 10 09/04/2019    BUN 13 05/16/2019    CR 0.69 09/04/2019    CR 0.67 05/16/2019    GLC 71 09/04/2019    GLC 95 05/16/2019     COAGS: No results found for: PTT, INR, FIBR  POC:   Lab Results   Component Value Date    HCG Positive (A) 11/17/2022     HEPATIC:   Lab Results   Component Value Date    ALBUMIN 4.0 09/04/2019    PROTTOTAL 7.8 09/04/2019    ALT 13 09/04/2019    AST 18 09/04/2019    ALKPHOS 50 09/04/2019    BILITOTAL 0.4 09/04/2019     OTHER:   Lab Results   Component Value Date    ENRIQUE 9.2 09/04/2019    LIPASE 30 09/04/2019       Anesthesia Plan    ASA Status:  2   NPO Status:  NPO Appropriate    Anesthesia Type: Spinal.              Consents    Anesthesia Plan(s) and associated risks, benefits, and realistic alternatives discussed. Questions answered and patient/representative(s) expressed understanding.     - Discussed: Risks, Benefits and Alternatives for BOTH SEDATION and the PROCEDURE were discussed     - Discussed with:  Patient,  (Hmong speaking)      - Extended Intubation/Ventilatory Support Discussed: No.      - Patient is DNR/DNI Status: No    Use of blood products discussed: Yes.     - Discussed with: Patient.     - Consented: consented to blood products            Reason for refusal: other.     Postoperative Care    Pain management: intrathecal morphine, Peripheral nerve block (Single Shot).   PONV prophylaxis: Ondansetron (or other 5HT-3)     Comments:    Other Comments: Pt consents to bilateral TAP blocks with Exparel for post op pain.  ITN for post op pain.            Rocio Marcum MD

## 2023-06-30 ENCOUNTER — APPOINTMENT (OUTPATIENT)
Dept: INTERPRETER SERVICES | Facility: CLINIC | Age: 43
End: 2023-06-30
Payer: COMMERCIAL

## 2023-06-30 LAB
HGB BLD-MCNC: 9.9 G/DL (ref 11.7–15.7)
PATH REPORT.COMMENTS IMP SPEC: NORMAL
PATH REPORT.FINAL DX SPEC: NORMAL
PATH REPORT.FINAL DX SPEC: NORMAL
PATH REPORT.GROSS SPEC: NORMAL
PATH REPORT.GROSS SPEC: NORMAL
PATH REPORT.MICROSCOPIC SPEC OTHER STN: NORMAL
PATH REPORT.MICROSCOPIC SPEC OTHER STN: NORMAL
PATH REPORT.RELEVANT HX SPEC: NORMAL
PATH REPORT.RELEVANT HX SPEC: NORMAL
PHOTO IMAGE: NORMAL
PHOTO IMAGE: NORMAL
T PALLIDUM AB SER QL: NONREACTIVE

## 2023-06-30 PROCEDURE — 250N000013 HC RX MED GY IP 250 OP 250 PS 637: Performed by: STUDENT IN AN ORGANIZED HEALTH CARE EDUCATION/TRAINING PROGRAM

## 2023-06-30 PROCEDURE — 250N000011 HC RX IP 250 OP 636: Mod: JZ | Performed by: STUDENT IN AN ORGANIZED HEALTH CARE EDUCATION/TRAINING PROGRAM

## 2023-06-30 PROCEDURE — 120N000001 HC R&B MED SURG/OB

## 2023-06-30 PROCEDURE — 36415 COLL VENOUS BLD VENIPUNCTURE: CPT | Performed by: STUDENT IN AN ORGANIZED HEALTH CARE EDUCATION/TRAINING PROGRAM

## 2023-06-30 PROCEDURE — 85018 HEMOGLOBIN: CPT | Performed by: STUDENT IN AN ORGANIZED HEALTH CARE EDUCATION/TRAINING PROGRAM

## 2023-06-30 RX ADMIN — IBUPROFEN 800 MG: 800 TABLET ORAL at 22:48

## 2023-06-30 RX ADMIN — ACETAMINOPHEN 975 MG: 325 TABLET ORAL at 07:59

## 2023-06-30 RX ADMIN — SENNOSIDES AND DOCUSATE SODIUM 1 TABLET: 50; 8.6 TABLET ORAL at 08:00

## 2023-06-30 RX ADMIN — ACETAMINOPHEN 975 MG: 325 TABLET ORAL at 02:01

## 2023-06-30 RX ADMIN — KETOROLAC TROMETHAMINE 30 MG: 30 INJECTION, SOLUTION INTRAMUSCULAR; INTRAVENOUS at 04:48

## 2023-06-30 RX ADMIN — KETOROLAC TROMETHAMINE 30 MG: 30 INJECTION, SOLUTION INTRAMUSCULAR; INTRAVENOUS at 11:16

## 2023-06-30 RX ADMIN — ACETAMINOPHEN 975 MG: 325 TABLET ORAL at 13:59

## 2023-06-30 RX ADMIN — SENNOSIDES AND DOCUSATE SODIUM 2 TABLET: 50; 8.6 TABLET ORAL at 22:48

## 2023-06-30 RX ADMIN — OXYCODONE HYDROCHLORIDE 5 MG: 5 TABLET ORAL at 22:48

## 2023-06-30 RX ADMIN — ACETAMINOPHEN 975 MG: 325 TABLET ORAL at 22:48

## 2023-06-30 RX ADMIN — IBUPROFEN 800 MG: 800 TABLET ORAL at 17:14

## 2023-06-30 ASSESSMENT — ACTIVITIES OF DAILY LIVING (ADL)
ADLS_ACUITY_SCORE: 18

## 2023-06-30 NOTE — PROGRESS NOTES
Csection - Post operative day 1    ASSESSMENT: PLAN:   POD#1    Repeat csection with bilateral tubal ligation (BTL)  Acute blood loss anemia- doing well- discussed s/s with patient- will monitor    Doing well  Continue routine cares   aniticipate discharge in am    SUBJECTIVE:    The patient feels well: Catheter is out, bleeding decreased, tolerating normal diet, and passing flatus.  Pain is well controlled. The patient has no emotional concerns.  The baby is well and being fed    OBJECTIVE:  BP 95/56 (BP Location: Left arm, Patient Position: Semi-Rebolledo's, Cuff Size: Adult Regular)   Pulse 71   Temp 98  F (36.7  C) (Oral)   Resp 16   LMP 09/09/2022 (Exact Date)   SpO2 96%   Breastfeeding Unknown     Fundus firm  Incision- dressing dry   Ext- nontender      Lab  Hemoglobin   Date Value Ref Range Status   06/30/2023 9.9 (L) 11.7 - 15.7 g/dL Final   ]        Meaghan Mccauley MD  [facility]  [phone]

## 2023-06-30 NOTE — PLAN OF CARE
Problem: Plan of Care - These are the overarching goals to be used throughout the patient stay.    Goal: Plan of Care Review  Outcome: Progressing  Nichelle is up independently. Postpartum assessment WNL. Mepilex dressing CDI. Ruiz catheter remains in place; sticky note for MD to re-evaluate.      Pain managed with Tylenol and Ibuprofen. Has denied pain today.     Formula feeding her baby. Significant other supportive at bedside.

## 2023-06-30 NOTE — PLAN OF CARE
Problem: Plan of Care - These are the overarching goals to be used throughout the patient stay.    Goal: Plan of Care Review  Outcome: Isabel Steward is up independently. Postpartum assessment WNL. Mepilex dressing CDI. Ruiz catheter in place.     Pain managed with Tylenol and Toradol.    Formula feeding her baby. Significant other supportive at bedside.     used for assessments.

## 2023-06-30 NOTE — PLAN OF CARE
Problem: Postpartum ( Delivery)  Goal: Hemostasis  Outcome: Progressing     Problem: Postpartum ( Delivery)  Goal: Optimal Pain Control and Function  Outcome: Progressing     Problem: Postpartum ( Delivery)  Goal: Effective Urinary Elimination  Outcome: Progressing   Goal Outcome Evaluation:    Progressing    Nichelle's vital signs are WNL. Bleeding is stable. Ambulating independently. Denies of any headaches, vision changes, or pain in upper right abdomen. Pain is tolerable with schedule tylenol and Toradol. Patient was not able to void the third time and was bladder scanned of 671 ml. Smith was placed per MD's order. Education provided on risks and factors of smith catheter. Continue to monitor.

## 2023-06-30 NOTE — PROVIDER NOTIFICATION
06/30/23 0242   Provider Notification   Provider Name/Title Dr. Barbour   Method of Notification Phone   Request Evaluate-Remote   Notification Reason Status Update     This RN updated MD that smith was out around 1900, patient attempted to void around 2200 but was not able to. Bladder scan was performed around 2330 with 430 cc. Straight cath was performed with output of 1200 ml. Two hours later, patient ambulated to bathroom independently and attempted to void but was not able to void. Patient states that she feels that she needs to urinate but is unable to do so. Bladder scan was performed showing 575 ml. Second straight cath was performed with output of 800 ml. Per MD, if patient is unable to void at the next 2 hour alo, place smith back in and leave it in for about 12 hours and will assess again later in the morning.

## 2023-06-30 NOTE — ANESTHESIA POSTPROCEDURE EVALUATION
Patient: Nichelle Soriano    Procedure: Procedure(s):  REPEAT  SECTION WITH BILATERAL SALPINGECTOMY       Anesthesia Type:  Spinal    Note:  Disposition: Admission   Postop Pain Control: Uneventful            Sign Out: Well controlled pain   PONV: No   Neuro/Psych: Uneventful            Sign Out: Acceptable/Baseline neuro status   Airway/Respiratory: Uneventful            Sign Out: Acceptable/Baseline resp. status   CV/Hemodynamics: Uneventful            Sign Out: Acceptable CV status; No obvious hypovolemia; No obvious fluid overload   Other NRE: NONE   DID A NON-ROUTINE EVENT OCCUR?            Last vitals:  Vitals:    23 2200 23 0158 23 0600   BP: 108/65 93/55 94/54   Pulse: 62 65 60   Resp: 18 17 16   Temp: 36.7  C (98  F) 36.7  C (98.1  F) 36.7  C (98  F)   SpO2: 97% 96% 96%       Electronically Signed By: Zach Sultana MD  2023  7:16 AM

## 2023-06-30 NOTE — PLAN OF CARE
Problem: Plan of Care - These are the overarching goals to be used throughout the patient stay.    Goal: Plan of Care Review  Description: The Plan of Care Review/Shift note should be completed every shift.  The Outcome Evaluation is a brief statement about your assessment that the patient is improving, declining, or no change.  This information will be displayed automatically on your shift note.  Outcome: Progressing  Patient rates pain level a 0 out of 10. Declined tylenol. Patient educated on pain control after  with . Patient verbalized understanding and took Toradol and okay with taking scheduled pain meds. Patient is ambulating inside room independently. Ruiz d/luis miguel at 1902. Patient encouraged to hydrate, and attempt to void. Warm water brought at bedside. No success noted yet. All hourly vitals and assessments completed and wnl.

## 2023-07-01 VITALS
HEART RATE: 71 BPM | DIASTOLIC BLOOD PRESSURE: 72 MMHG | WEIGHT: 162.7 LBS | TEMPERATURE: 98.2 F | RESPIRATION RATE: 18 BRPM | BODY MASS INDEX: 32.86 KG/M2 | SYSTOLIC BLOOD PRESSURE: 118 MMHG | OXYGEN SATURATION: 98 %

## 2023-07-01 PROCEDURE — 250N000013 HC RX MED GY IP 250 OP 250 PS 637: Performed by: STUDENT IN AN ORGANIZED HEALTH CARE EDUCATION/TRAINING PROGRAM

## 2023-07-01 RX ORDER — OXYCODONE HYDROCHLORIDE 5 MG/1
5 TABLET ORAL EVERY 6 HOURS PRN
Qty: 10 TABLET | Refills: 0 | Status: SHIPPED | OUTPATIENT
Start: 2023-07-01 | End: 2023-09-26

## 2023-07-01 RX ORDER — AMOXICILLIN 250 MG
1 CAPSULE ORAL 2 TIMES DAILY
Qty: 14 TABLET | Refills: 1 | Status: SHIPPED | OUTPATIENT
Start: 2023-07-01 | End: 2023-09-26

## 2023-07-01 RX ORDER — IBUPROFEN 800 MG/1
800 TABLET, FILM COATED ORAL EVERY 6 HOURS PRN
Qty: 60 TABLET | Refills: 1 | Status: SHIPPED | OUTPATIENT
Start: 2023-07-01 | End: 2023-09-26

## 2023-07-01 RX ADMIN — ACETAMINOPHEN 975 MG: 325 TABLET ORAL at 05:20

## 2023-07-01 RX ADMIN — OXYCODONE HYDROCHLORIDE 5 MG: 5 TABLET ORAL at 08:03

## 2023-07-01 RX ADMIN — ACETAMINOPHEN 975 MG: 325 TABLET ORAL at 11:40

## 2023-07-01 RX ADMIN — IBUPROFEN 800 MG: 800 TABLET ORAL at 05:20

## 2023-07-01 RX ADMIN — SENNOSIDES AND DOCUSATE SODIUM 2 TABLET: 50; 8.6 TABLET ORAL at 08:03

## 2023-07-01 RX ADMIN — IBUPROFEN 800 MG: 800 TABLET ORAL at 11:40

## 2023-07-01 ASSESSMENT — ACTIVITIES OF DAILY LIVING (ADL)
ADLS_ACUITY_SCORE: 18

## 2023-07-01 NOTE — PROGRESS NOTES
Reviewed discharge paperwork, follow-up care and warning signs with Nichelle and spouse via .     Birth certificate and mood assessment filled out with .     Home via spouse with baby in car seat.    Pain managed at discharge. Postpartum assessment WNL.

## 2023-07-01 NOTE — DISCHARGE SUMMARY
DELIVERY DISCHARGE SUMMARY      Patient Name:  Nichelle Soriano  :      1980  MRN:      [id number]    Admission Date: 2023  Delivery Date: 23  Gestational Age at Delivery: 38w6d  Discharge Date: 2023    Patient Active Problem List   Diagnosis     Anemia     Alpha Thalassemia Trait     Acne     AMA (advanced maternal age) multigravida 35+     Hx successful  (vaginal birth after ), currently pregnant     Recurrent pregnancy loss (hydrops due to thalassemia carrier in pt and partner)     S/P repeat low transverse        Conditions Complicating Pregnancy: see above    Primary Procedure performed: repeat low transverse  section, bilateral salpingectomy  Other Procedures performed: none    Condition at discharge:  Stable    Discharge Plan:     Follow-up with Obstetrician in 2 weeks    Instructions:   Activity: no driving while on narcotics, no lifting more than 15 pounds for 6 weeks   Regular diet   Medications: See Med Rec      JOSSE BOLDEN MD on 2023 at 12:22 PM

## 2023-07-01 NOTE — DISCHARGE INSTRUCTIONS
Warning Signs after Having a Baby    Keep this paper on your fridge or somewhere else where you can see it.    Call your provider if you have any of these symptoms up to 12 weeks after having your baby.    Thoughts of hurting yourself or your baby  Pain in your chest or trouble breathing  Severe headache not helped by pain medicine  Eyesight concerns (blurry vision, seeing spots or flashes of light, other changes to eyesight)  Fainting, shaking or other signs of a seizure    Call 9-1-1 if you feel that it is an emergency.     The symptoms below can happen to anyone after giving birth. They can be very serious. Call your provider if you have any of these warning signs.    My provider s phone number: _______________________    Losing too much blood (hemorrhage)    Call your provider if you soak through a pad in less than an hour or pass blood clots bigger than a golf ball. These may be signs that you are bleeding too much.    Blood clots in the legs or lungs    After you give birth, your body naturally clots its blood to help prevent blood loss. Sometimes this increased clotting can happen in other areas of the body, like the legs or lungs. This can block your blood flow and be very dangerous.     Call your provider if you:  Have a red, swollen spot on the back of your leg that is warm or painful when you touch it.   Are coughing up blood.     Infection    Call your provider if you have any of these symptoms:  Fever of 100.4 F (38 C) or higher.  Pain or redness around your stitches if you had an incision.   Any yellow, white, or green fluid coming from places where you had stitches or surgery.    Mood Problems (postpartum depression)    Many people feel sad or have mood changes after having a baby. But for some people, these mood swings are worse.     Call your provider right away if you feel so anxious or nervous that you can't care for yourself or your baby.    Preeclampsia (high blood pressure)    Even if you  didn't have high blood pressure when you were pregnant, you are at risk for the high blood pressure disease called preeclampsia. This risk can last up to 12 weeks after giving birth.     Call your provider if you have:   Pain on your right side under your rib cage  Sudden swelling in the hands and face    Remember: You know your body. If something doesn't feel right, get medical help.     For informational purposes only. Not to replace the advice of your health care provider. Copyright 2020 [facility]. All rights reserved. Clinically reviewed by Patricia Siegel, RNC-OB, MSN. [employer] [id number] - Rev .    Postop  Birth Instructions    Activity     Do not lift more than 10 pounds for 6 weeks after surgery.  Ask family and friends for help when you need it.  No driving until you have stopped taking your pain medications (usually two weeks after surgery).  No heavy exercise or activity for 6 weeks.  Don't do anything that will put a strain on your surgery site.  Don't strain when using the toilet.  Your care team may prescribe a stool softener if you have problems with your bowel movements.     To care for your incision:     Keep the incision clean and dry.  Do not soak your incision in water. No swimming or hot tubs until it has fully healed. You may soak in the bathtub if the water level is below your incision.  Do not use peroxide, gel, cream, lotion, or ointment on your incision.  Adjust your clothes to avoid pressure on your surgery site (check the elastic in your underwear for example).     You may see a small amount of clear or pink drainage and this is normal.  Check with your health care provider:     If the drainage increases or has an odor.  If the incision reddens, you have swelling, or develop a rash.  If you have increased pain and the medicine we prescribed doesn't help.  If you have a fever above 100.4 F (38 C) with or without chills when placing thermometer under your tongue.    The area around your incision (surgery wound), will feel numb.  This is normal. The numbness should go away in less than a year.     Keep your hands clean:  Always wash your hands before touching your incision (surgery wound). This helps reduce your risk of infection. If your hands aren't dirty, you may use an alcohol hand-rub to clean your hands. Keep your nails clean and short.    Call your healthcare provider if you have any of these symptoms:     You soak a sanitary pad with blood within 1 hour, or you see blood clots larger than a golf ball.  Bleeding that lasts more than 6 weeks.  Vaginal discharge that smells bad.  Severe pain, cramping or tenderness in your lower belly area.  A need to urinate more frequently (use the toilet more often), more urgently (use the toilet very quickly), or it burns when you urinate.  Nausea and vomiting.  Redness, swelling or pain around a vein in your leg.  Problems breastfeeding or a red or painful area on your breast.  Chest pain and cough or are gasping for air.  Problems with coping with sadness, anxiety or depression. If you have concerns about hurting yourself or the baby, call your provider immediately.    You have questions or concerns after you return home.

## 2023-07-01 NOTE — PLAN OF CARE
"  Problem: Plan of Care - These are the overarching goals to be used throughout the patient stay.    Goal: Plan of Care Review  Description: The Plan of Care Review/Shift note should be completed every shift.  The Outcome Evaluation is a brief statement about your assessment that the patient is improving, declining, or no change.  This information will be displayed automatically on your shift note.  Outcome: Progressing  Flowsheets (Taken 2023)  Plan of Care Reviewed With:   patient   significant other  Overall Patient Progress: improving     Problem: Plan of Care - These are the overarching goals to be used throughout the patient stay.    Goal: Patient-Specific Goal (Individualized)  Description: You can add care plan individualizations to a care plan. Examples of Individualization might be:  \"Parent requests to be called daily at 9am for status\", \"I have a hard time hearing out of my right ear\", or \"Do not touch me to wake me up as it startles me\".  Outcome: Progressing     Problem: Postpartum ( Delivery)  Goal: Effective Bowel Elimination  Outcome: Progressing     Problem: Postpartum ( Delivery)  Goal: Effective Urinary Elimination  Outcome: Progressing     Problem: Postpartum ( Delivery)  Goal: Optimal Pain Control and Function  Outcome: Progressing   Goal Outcome Evaluation:      Plan of Care Reviewed With: patient, significant other    Overall Patient Progress: improvingOverall Patient Progress: improving      Stable post-partum recovery, Ruiz to be discontinued at 0600.       "

## 2023-07-05 ENCOUNTER — APPOINTMENT (OUTPATIENT)
Dept: INTERPRETER SERVICES | Facility: CLINIC | Age: 43
End: 2023-07-05
Payer: COMMERCIAL

## 2023-09-26 ENCOUNTER — OFFICE VISIT (OUTPATIENT)
Dept: FAMILY MEDICINE | Facility: CLINIC | Age: 43
End: 2023-09-26
Payer: COMMERCIAL

## 2023-09-26 VITALS
RESPIRATION RATE: 16 BRPM | OXYGEN SATURATION: 98 % | SYSTOLIC BLOOD PRESSURE: 108 MMHG | TEMPERATURE: 98.1 F | HEART RATE: 68 BPM | BODY MASS INDEX: 28.58 KG/M2 | WEIGHT: 141.5 LBS | DIASTOLIC BLOOD PRESSURE: 73 MMHG

## 2023-09-26 DIAGNOSIS — R10.13 EPIGASTRIC PAIN: Primary | ICD-10-CM

## 2023-09-26 DIAGNOSIS — R10.11 RUQ ABDOMINAL PAIN: ICD-10-CM

## 2023-09-26 PROBLEM — D64.9 ANEMIA: Status: ACTIVE | Noted: 2023-09-26

## 2023-09-26 PROBLEM — O99.019 ANEMIA OF PREGNANCY: Status: ACTIVE | Noted: 2023-09-26

## 2023-09-26 PROBLEM — D56.0 ALPHA THALASSEMIA (H): Status: ACTIVE | Noted: 2023-09-26

## 2023-09-26 PROBLEM — N96 RECURRENT PREGNANCY LOSS: Status: ACTIVE | Noted: 2022-12-09

## 2023-09-26 PROCEDURE — 99214 OFFICE O/P EST MOD 30 MIN: CPT | Performed by: STUDENT IN AN ORGANIZED HEALTH CARE EDUCATION/TRAINING PROGRAM

## 2023-09-26 NOTE — PROGRESS NOTES
"Assessment & Plan     Epigastric pain  RUQ abdominal pain  2mo h/o RUQ/epigastric pain radiating into back and nausea without nausea, fever, diarrhea, constipation. Of note: patient describes her pain as right-sided chest pain but she points to her epigastric and right upper quadrant when asked where the pain is. Considered a wide range of differentials; low concern for acute cholangitis or appendicitis given timeline, vitals, and exam with neg Rodriguez's sign. S/p bilateral salpingectomy so no chance of pregnancy. Would expect more acute pain with pancreatitis. No SOB or red flag symptoms concerning for ACS.  Aortic dissection incredibly unlikely in the absence of hyperlipidemia or hypertension. Ddx: gall stones or biliary colic (more likely to be formed during pregnancy), GERD, acute hepatitis.  As her symptoms are nonacute, do not feel that it is necessary to send her for emergent abdominal CT.  Will trial PPI and if no improvement after 14 days, follow-up with PCP for further evaluation.  Discussed the plan with patient and her  and they were in agreement.  - omeprazole (PRILOSEC) 20 MG DR capsule  Dispense: 30 capsule; Refill: 0    Patient Instructions:  If you develop vomiting that doesn't stop, weight loss without trying, or fever, return to be seen.    Return in about 1 month (around 10/26/2023) for In clinic with your primary care provider.    Malgorzata Fierro, DO  she/her  [facility]    Subjective     Nichelle Soriano is a 43 year old female who presents to clinic today for the following health issues:    HPI    Rt rib pain to Rt upper back pain x 2 month after birth of child. Some nausea.     Repeat  with bilateral salpingectomy on 2023.  Saw OB after 1 week and was cleared, recommended annual appt. Every time after eats, feels like food is stuck there and radiates to the back.  Nausea without vomiting. The pain comes and goes- doesn't always come after she ate \"feels " "like something wants to come back out\"  No dominguez with fatty or spicy food. Notices that salty food makes her burning pain feel worse RUQ/epigastric. Muscles on her neck feel tense  Not breastfeeding  Not taking any medications, no NSAID use, no recent travel  No headaches, diarrhea, constipation  Appetite: normal  No alcohol use    No past medical history on file.    Allergies   Allergen Reactions    No Known Drug Allergy Unknown     Current Outpatient Medications   Medication    omeprazole (PRILOSEC) 20 MG DR capsule     No current facility-administered medications for this visit.      Review of Systems  Constitutional, HEENT, cardiovascular, pulmonary, gi and gu systems are negative, except as otherwise noted.      Objective    /73   Pulse 68   Temp 98.1  F (36.7  C) (Oral)   Resp 16   Wt 64.2 kg (141 lb 8 oz)   SpO2 98%   Breastfeeding No   BMI 28.58 kg/m      Physical Exam   GENERAL: healthy, alert and no distress  EYES: Eyes grossly normal to inspection, PERRL and conjunctivae and sclerae normal without jaundice  RESP: lungs clear to auscultation - no rales, rhonchi or wheezes  CV: regular rate and rhythm, normal S1 S2, no S3 or S4, no murmur, click or rub, no peripheral edema and peripheral pulses strong  Skin: Transverse  scar healing well without signs of infection. No jaundice  ABDOMEN: tenderness epigastric and RUQ, no organomegaly or masses, no rebound tenderness.  Negative Rodriguez sign  : no suprapubic tenderness    The use of Dragon/[employer] dictation services may have been used to construct the content in this note; any grammatical or spelling errors are non-intentional. Please contact the author of this note directly if you are in need of any clarification.  "

## 2023-09-26 NOTE — PATIENT INSTRUCTIONS
It sounds like you're experiencing acid-reflux disease. Take the medicine daily for 1 month  Return to see your PCP in 1 month. If there is no improvement in symptoms, they will likely order a CT scan to check your gall bladder and pancreas.    If you develop vomiting that doesn't stop, weight loss without trying, or fever, return to be seen.

## 2023-09-28 ENCOUNTER — APPOINTMENT (OUTPATIENT)
Dept: ULTRASOUND IMAGING | Facility: HOSPITAL | Age: 43
End: 2023-09-28
Attending: EMERGENCY MEDICINE
Payer: COMMERCIAL

## 2023-09-28 ENCOUNTER — HOSPITAL ENCOUNTER (EMERGENCY)
Facility: HOSPITAL | Age: 43
Discharge: HOME | End: 2023-09-28
Attending: EMERGENCY MEDICINE | Admitting: EMERGENCY MEDICINE
Payer: COMMERCIAL

## 2023-09-28 VITALS
BODY MASS INDEX: 27.68 KG/M2 | WEIGHT: 141 LBS | SYSTOLIC BLOOD PRESSURE: 119 MMHG | OXYGEN SATURATION: 99 % | DIASTOLIC BLOOD PRESSURE: 61 MMHG | HEIGHT: 60 IN | RESPIRATION RATE: 25 BRPM | TEMPERATURE: 97.8 F | HEART RATE: 64 BPM

## 2023-09-28 DIAGNOSIS — R10.13 EPIGASTRIC PAIN: ICD-10-CM

## 2023-09-28 LAB
ALBUMIN SERPL BCG-MCNC: 4.4 G/DL (ref 3.5–5.2)
ALP SERPL-CCNC: 63 U/L (ref 35–104)
ALT SERPL W P-5'-P-CCNC: 19 U/L (ref 0–50)
ANION GAP SERPL CALCULATED.3IONS-SCNC: 10 MMOL/L (ref 7–15)
AST SERPL W P-5'-P-CCNC: 22 U/L (ref 0–45)
BASOPHILS # BLD AUTO: 0 10E3/UL (ref 0–0.2)
BASOPHILS NFR BLD AUTO: 1 %
BILIRUB SERPL-MCNC: 0.3 MG/DL
BUN SERPL-MCNC: 12.4 MG/DL (ref 6–20)
CALCIUM SERPL-MCNC: 9.1 MG/DL (ref 8.6–10)
CHLORIDE SERPL-SCNC: 106 MMOL/L (ref 98–107)
CREAT SERPL-MCNC: 0.55 MG/DL (ref 0.51–0.95)
EGFRCR SERPLBLD CKD-EPI 2021: >90 ML/MIN/1.73M2
EOSINOPHIL # BLD AUTO: 0.1 10E3/UL (ref 0–0.7)
EOSINOPHIL NFR BLD AUTO: 2 %
ERYTHROCYTE [DISTWIDTH] IN BLOOD BY AUTOMATED COUNT: 14.1 % (ref 10–15)
GLUCOSE SERPL-MCNC: 97 MG/DL (ref 70–99)
HCO3 SERPL-SCNC: 26 MMOL/L (ref 22–29)
HCT VFR BLD AUTO: 35.9 % (ref 35–47)
HGB BLD-MCNC: 10.7 G/DL (ref 11.7–15.7)
IMM GRANULOCYTES # BLD: 0 10E3/UL
IMM GRANULOCYTES NFR BLD: 0 %
LIPASE SERPL-CCNC: 44 U/L (ref 13–60)
LYMPHOCYTES # BLD AUTO: 1.7 10E3/UL (ref 0.8–5.3)
LYMPHOCYTES NFR BLD AUTO: 32 %
MCH RBC QN AUTO: 20.9 PG (ref 26.5–33)
MCHC RBC AUTO-ENTMCNC: 29.8 G/DL (ref 31.5–36.5)
MCV RBC AUTO: 70 FL (ref 78–100)
MONOCYTES # BLD AUTO: 0.4 10E3/UL (ref 0–1.3)
MONOCYTES NFR BLD AUTO: 7 %
NEUTROPHILS # BLD AUTO: 3.2 10E3/UL (ref 1.6–8.3)
NEUTROPHILS NFR BLD AUTO: 58 %
NRBC # BLD AUTO: 0 10E3/UL
NRBC BLD AUTO-RTO: 0 /100
PLATELET # BLD AUTO: 352 10E3/UL (ref 150–450)
POTASSIUM SERPL-SCNC: 3.5 MMOL/L (ref 3.4–5.3)
PROT SERPL-MCNC: 7.4 G/DL (ref 6.4–8.3)
RBC # BLD AUTO: 5.12 10E6/UL (ref 3.8–5.2)
SODIUM SERPL-SCNC: 142 MMOL/L (ref 135–145)
TROPONIN T SERPL HS-MCNC: <6 NG/L
WBC # BLD AUTO: 5.5 10E3/UL (ref 4–11)

## 2023-09-28 PROCEDURE — 80053 COMPREHEN METABOLIC PANEL: CPT | Performed by: EMERGENCY MEDICINE

## 2023-09-28 PROCEDURE — 93005 ELECTROCARDIOGRAM TRACING: CPT | Performed by: EMERGENCY MEDICINE

## 2023-09-28 PROCEDURE — 96374 THER/PROPH/DIAG INJ IV PUSH: CPT

## 2023-09-28 PROCEDURE — 76705 ECHO EXAM OF ABDOMEN: CPT

## 2023-09-28 PROCEDURE — 99285 EMERGENCY DEPT VISIT HI MDM: CPT | Mod: 25

## 2023-09-28 PROCEDURE — 36415 COLL VENOUS BLD VENIPUNCTURE: CPT | Performed by: EMERGENCY MEDICINE

## 2023-09-28 PROCEDURE — 85004 AUTOMATED DIFF WBC COUNT: CPT | Performed by: EMERGENCY MEDICINE

## 2023-09-28 PROCEDURE — 83690 ASSAY OF LIPASE: CPT | Performed by: EMERGENCY MEDICINE

## 2023-09-28 PROCEDURE — 250N000013 HC RX MED GY IP 250 OP 250 PS 637: Performed by: EMERGENCY MEDICINE

## 2023-09-28 PROCEDURE — 84484 ASSAY OF TROPONIN QUANT: CPT | Performed by: EMERGENCY MEDICINE

## 2023-09-28 PROCEDURE — 250N000011 HC RX IP 250 OP 636: Mod: JZ | Performed by: EMERGENCY MEDICINE

## 2023-09-28 RX ORDER — FAMOTIDINE 20 MG/1
20 TABLET, FILM COATED ORAL 2 TIMES DAILY
Qty: 60 TABLET | Refills: 0 | Status: SHIPPED | OUTPATIENT
Start: 2023-09-28

## 2023-09-28 RX ORDER — SUCRALFATE ORAL 1 G/10ML
1 SUSPENSION ORAL 4 TIMES DAILY PRN
Qty: 414 ML | Refills: 0 | Status: SHIPPED | OUTPATIENT
Start: 2023-09-28

## 2023-09-28 RX ORDER — MAGNESIUM HYDROXIDE/ALUMINUM HYDROXICE/SIMETHICONE 120; 1200; 1200 MG/30ML; MG/30ML; MG/30ML
15 SUSPENSION ORAL ONCE
Status: COMPLETED | OUTPATIENT
Start: 2023-09-28 | End: 2023-09-28

## 2023-09-28 RX ADMIN — FAMOTIDINE 20 MG: 10 INJECTION, SOLUTION INTRAVENOUS at 10:41

## 2023-09-28 RX ADMIN — ALUMINUM HYDROXIDE, MAGNESIUM HYDROXIDE, AND SIMETHICONE 15 ML: 200; 200; 20 SUSPENSION ORAL at 12:27

## 2023-09-28 ASSESSMENT — ENCOUNTER SYMPTOMS: ABDOMINAL PAIN: 1

## 2023-09-28 ASSESSMENT — ACTIVITIES OF DAILY LIVING (ADL): ADLS_ACUITY_SCORE: 35

## 2023-09-28 NOTE — ED PROVIDER NOTES
EMERGENCY DEPARTMENT ENCOUNTER     NAME: Nichelle Soriano   AGE: 43 year old female   YOB: 1980   MRN: [id number]   EVALUATION DATE & TIME: 9/28/2023 10:03 AM   PCP: Tonia Marsh     Chief Complaint   Patient presents with    Abdominal Pain    Back Pain   :    FINAL IMPRESSION       1. Epigastric pain           ED COURSE & MEDICAL DECISION MAKING      10:18 AM I met with the patient to gather history and to perform my initial exam. We discussed plans for the ED course, including diagnostic testing and treatment.   12:40 PM Rechecked and updated patient.   12:52 PM Rechecked and updated patient. I discussed plans for discharge with the patient, which they were agreeable to. We discussed supportive cares at home and reasons for return to the ER including new or worsening symptoms. All questions and concerns were addressed. Patient to be discharged by RN.       Pertinent Labs & Imaging studies reviewed. (See chart for details)   43 year old female  presents to the Emergency Department for evaluation of 1 week of epigastric pain.  On chart review, patient was seen by PCP clinic 2 days ago and started on a PPI but is here because she is not improved. Initial Vitals Reviewed. Initial exam notable for early well-appearing patient who is afebrile with mild epigastric and right upper quadrant tenderness.  Negative Rodriguez sign suggests against acute cholecystitis but I did consider biliary colic, GERD, gastritis, pancreatitis.  I am much less suspicious of ACS but I did do an EKG and single troponin which are negative and I feel this adequately rules it out.  Labs do not show signs of biliary abnormality or pancreatitis.  Right upper quadrant ultrasound is negative.  I think this is consistent with GERD as per the PCP suspicion, and I am not surprised that the patient is not improved as a PPI is not going to work for the first couple of weeks.  I discussed this with patient and family member and in the  meantime were going to add Pepcid and Carafate for symptomatic usage and have her continue the prescription PPI as per the PCP.  Here, she improved with Pepcid and a GI cocktail and is comfortable with this discharge plan.           At the conclusion of the encounter I discussed the results of all of the tests and the disposition. The questions were answered. The patient or family acknowledged understanding and was agreeable with the care plan.     0 minutes critical care time, see procedure note below for details if relevant    Medical Decision Making    History:  Supplemental history from: Documented in chart, if applicable, fmaily member  External Record(s) reviewed: Documented in chart, if applicable. and Outpatient Record: [facility] visit on 09/26/23    Work Up:  Chart documentation includes differential considered and any EKGs or imaging independently interpreted by provider, where specified.  In additional to work up documented, I considered the following work up: Documented in chart, if applicable.    External consultation:  Discussion of management with another provider: Documented in chart, if applicable    Complicating factors:  Care impacted by chronic illness: Other: Alpha thalassemia  Care affected by social determinants of health: Access to Medical Care    Disposition considerations: Discharge. I prescribed additional prescription strength medication(s) as charted. I considered admission, but ultimately discharged patient with reassuring work-up.        MEDICATIONS GIVEN IN THE EMERGENCY:   Medications   famotidine (PEPCID) injection 20 mg (20 mg Intravenous $Given 9/28/23 1041)   alum & mag hydroxide-simethicone (MAALOX) suspension 15 mL (15 mLs Oral $Given 9/28/23 1227)      NEW PRESCRIPTIONS STARTED AT TODAY'S ER VISIT   New Prescriptions    FAMOTIDINE (PEPCID) 20 MG TABLET    Take 1 tablet (20 mg) by mouth 2 times daily    SUCRALFATE (CARAFATE) 1 GM/10ML SUSPENSION    Take  10 mLs (1 g) by mouth 4 times daily as needed (pain)     ================================================================   HISTORY OF PRESENT ILLNESS       Patient information was obtained from: Patient     Use of Intrepreter: N/A     Nichelle Soriano is a 43 year old female with a pertinent medical history of alpha thalassemia and anemia who presents to the ED for evaluation of abdominal pain.     Per Chart Review, patient was seen at [facility] on 23 for evaluation of 2mo h/o RUQ/epigastric pain radiating into back and nausea without, fever, diarrhea, constipation. Of note: patient described her pain as right-sided chest pain but she points to her epigastric and right upper quadrant when asked where the pain is. Patient was prescribed Omeprazole and instructed to follow-up with PCP for further evaluation if no improvement after 14 days, follow-up with PCP for further evaluation.     Patient endorss the history above, and notes that she was prompted to visit the ED because the medication she was prescribed has not provided any improvement. She endorsdes having epigastirc and right upper quadrant abdominal pain for approximately one week. She denies any other complications at this time.    ================================================================    REVIEW OF SYSTEMS       Review of Systems   Gastrointestinal:  Positive for abdominal pain (epigastric and RUQ).   All other systems reviewed and are negative.        PAST HISTORY     PAST MEDICAL HISTORY:   History reviewed. No pertinent past medical history.   PAST SURGICAL HISTORY:   Past Surgical History:   Procedure Laterality Date     SECTION      COMBINED  SECTION, SALPINGECTOMY BILATERAL Bilateral 2023    Procedure: REPEAT  SECTION WITH BILATERAL SALPINGECTOMY;  Surgeon: Meaghan Gabriel MD;  Location: [facility] OR      CURRENT MEDICATIONS:   famotidine (PEPCID) 20 MG tablet  sucralfate  (CARAFATE) 1 GM/10ML suspension  omeprazole (PRILOSEC) 20 MG DR capsule      ALLERGIES:   Allergies   Allergen Reactions    No Known Drug Allergy Unknown      FAMILY HISTORY:   Family History   Problem Relation Age of Onset    Thalassemia Daughter         Deanna Thalassemia DISEASE    Hashimoto's thyroiditis Daughter     No Known Problems Son     No Known Problems Son       SOCIAL HISTORY:   Social History     Socioeconomic History    Marital status:    Tobacco Use    Smoking status: Never    Smokeless tobacco: Never    Tobacco comments:     no passive exposure   Substance and Sexual Activity    Alcohol use: No    Drug use: No    Sexual activity: Yes     Birth control/protection: None        VITALS  Patient Vitals for the past 24 hrs:   BP Temp Temp src Pulse Resp SpO2 Height Weight   09/28/23 1230 119/61 -- -- 64 25 99 % -- --   09/28/23 1130 117/71 -- -- 53 16 99 % -- --   09/28/23 1115 122/75 -- -- 54 16 99 % -- --   09/28/23 1100 119/72 -- -- 57 16 99 % -- --   09/28/23 1045 122/69 -- -- 57 15 100 % -- --   09/28/23 1040 123/74 -- -- 60 15 -- -- --   09/28/23 0957 (!) 142/80 97.8  F (36.6  C) Oral 65 16 100 % 1.524 m (5') 64 kg (141 lb)        ================================================================    PHYSICAL EXAM     VITAL SIGNS: /61   Pulse 64   Temp 97.8  F (36.6  C) (Oral)   Resp 25   Ht 1.524 m (5')   Wt 64 kg (141 lb)   SpO2 99%   BMI 27.54 kg/m     Constitutional:  Awake, no acute distress   HENT:  Atraumatic, oropharynx without exudate or erythema, membranes moist  Lymph:  No adenopathy  Eyes: EOM intact, PERRL, no injection  Neck: Supple  Respiratory:  Clear to auscultation bilaterally, no wheezes or crackles   Cardiovascular:  Regular rate and rhythm, single S1 and S2   GI:  Epigastric and right upper quadrant abdominal tenderness. Soft, nondistended, no rebound or guarding   Musculoskeletal:  Moves all extremities, no lower extremity edema, no deformities    Skin:  Warm,  dry  Neurologic:  Alert and oriented x3, no focal deficits noted       ================================================================  LAB       All pertinent labs reviewed and interpreted.   Labs Ordered and Resulted from Time of ED Arrival to Time of ED Departure   CBC WITH PLATELETS AND DIFFERENTIAL - Abnormal       Result Value    WBC Count 5.5      RBC Count 5.12      Hemoglobin 10.7 (*)     Hematocrit 35.9      MCV 70 (*)     MCH 20.9 (*)     MCHC 29.8 (*)     RDW 14.1      Platelet Count 352      % Neutrophils 58      % Lymphocytes 32      % Monocytes 7      % Eosinophils 2      % Basophils 1      % Immature Granulocytes 0      NRBCs per 100 WBC 0      Absolute Neutrophils 3.2      Absolute Lymphocytes 1.7      Absolute Monocytes 0.4      Absolute Eosinophils 0.1      Absolute Basophils 0.0      Absolute Immature Granulocytes 0.0      Absolute NRBCs 0.0     COMPREHENSIVE METABOLIC PANEL - Normal    Sodium 142      Potassium 3.5      Carbon Dioxide (CO2) 26      Anion Gap 10      Urea Nitrogen 12.4      Creatinine 0.55      GFR Estimate >90      Calcium 9.1      Chloride 106      Glucose 97      Alkaline Phosphatase 63      AST 22      ALT 19      Protein Total 7.4      Albumin 4.4      Bilirubin Total 0.3     LIPASE - Normal    Lipase 44     TROPONIN T, HIGH SENSITIVITY - Normal    Troponin T, High Sensitivity <6          ===============================================================  RADIOLOGY       Reviewed all pertinent imaging. Please see official radiology report.   Abdomen US, limited (RUQ only)   Final Result   IMPRESSION:   1.  No abnormalities are seen to explain symptoms.   2.  No evidence of cholelithiasis or cholecystitis.   3.  Stable benign calcifications in the liver.                  ================================================================  EKG     EKG reviewed interpreted by me shows sinus rhythm with rate of 60, normal axis, QTc 430 with no acute ST or T wave changes since May  2019    I have independently reviewed and interpreted the EKG(s) documented above.     ================================================================  PROCEDURES     None.    I, Zach Arandastu, am serving as a scribe to document services personally performed by Dr. Soto based on my observation and the provider's statements to me. I, Mirna Soto MD attest that Zach Christos is acting in a scribe capacity, has observed my performance of the services and has documented them in accordance with my direction.   Mirna Soto M.D.   Emergency Medicine   [facility] EMERGENCY DEPARTMENT  [address] [place] [id number]  [phone]  Dept: [phone]        Mirna Soto MD  09/28/23 5200

## 2023-09-28 NOTE — DISCHARGE INSTRUCTIONS
Tests in the ER are reassuring.  There are no signs of a problem with your gallbladder or pancreas.  Symptoms are consistent with acid reflux.  Keep taking the medication that your doctor gave you and it may take 2 weeks for this to start working.  In the meantime, you can also take the medications I am prescribing today to help with the symptoms in the interim.

## 2023-09-28 NOTE — ED TRIAGE NOTES
Patient c/o upper back pain and RUQ pain and epigastric pain for a week now. No nausea , no vomiting.     Triage Assessment       Row Name 09/28/23 5463       Triage Assessment (Adult)    Airway WDL WDL       Respiratory WDL    Respiratory WDL WDL       Skin Circulation/Temperature WDL    Skin Circulation/Temperature WDL WDL       Cardiac WDL    Cardiac WDL WDL  back pain       Peripheral/Neurovascular WDL    Peripheral Neurovascular WDL WDL       Cognitive/Neuro/Behavioral WDL    Cognitive/Neuro/Behavioral WDL WDL

## 2023-09-28 NOTE — ED NOTES
Pt had requested to do CXR. Spoke to Dr Soto about it too then went back to pt to explain to her that no CXR will be done because her symptoms are abdominal related. The US will do better job at capturing the pain and explained that this is probably acid reflux. Spoke to pt in her primary language and she verbalized understanding.

## 2023-09-30 LAB
ATRIAL RATE - MUSE: 60 BPM
DIASTOLIC BLOOD PRESSURE - MUSE: NORMAL MMHG
INTERPRETATION ECG - MUSE: NORMAL
P AXIS - MUSE: 42 DEGREES
PR INTERVAL - MUSE: 150 MS
QRS DURATION - MUSE: 84 MS
QT - MUSE: 430 MS
QTC - MUSE: 430 MS
R AXIS - MUSE: 76 DEGREES
SYSTOLIC BLOOD PRESSURE - MUSE: NORMAL MMHG
T AXIS - MUSE: 6 DEGREES
VENTRICULAR RATE- MUSE: 60 BPM

## 2023-12-13 ENCOUNTER — ALLIED HEALTH/NURSE VISIT (OUTPATIENT)
Dept: FAMILY MEDICINE | Facility: CLINIC | Age: 43
End: 2023-12-13
Payer: COMMERCIAL

## 2023-12-13 DIAGNOSIS — Z23 HIGH PRIORITY FOR 2019-NCOV VACCINE: Primary | ICD-10-CM

## 2023-12-13 PROCEDURE — 90480 ADMN SARSCOV2 VAC 1/ONLY CMP: CPT

## 2023-12-13 PROCEDURE — 99207 PR NO CHARGE NURSE ONLY: CPT

## 2023-12-13 PROCEDURE — 91320 SARSCV2 VAC 30MCG TRS-SUC IM: CPT

## 2025-04-10 ENCOUNTER — HOSPITAL ENCOUNTER (EMERGENCY)
Facility: HOSPITAL | Age: 45
Discharge: HOME | End: 2025-04-10
Attending: EMERGENCY MEDICINE
Payer: MEDICAID

## 2025-04-10 ENCOUNTER — ANCILLARY PROCEDURE (OUTPATIENT)
Dept: ULTRASOUND IMAGING | Facility: HOSPITAL | Age: 45
End: 2025-04-10
Attending: EMERGENCY MEDICINE
Payer: MEDICAID

## 2025-04-10 VITALS
RESPIRATION RATE: 14 BRPM | TEMPERATURE: 97.8 F | DIASTOLIC BLOOD PRESSURE: 78 MMHG | OXYGEN SATURATION: 100 % | HEART RATE: 62 BPM | WEIGHT: 141 LBS | BODY MASS INDEX: 27.54 KG/M2 | SYSTOLIC BLOOD PRESSURE: 117 MMHG

## 2025-04-10 DIAGNOSIS — D64.9 ANEMIA, UNSPECIFIED TYPE: ICD-10-CM

## 2025-04-10 DIAGNOSIS — K21.9 GASTROESOPHAGEAL REFLUX DISEASE WITHOUT ESOPHAGITIS: ICD-10-CM

## 2025-04-10 DIAGNOSIS — E87.6 HYPOKALEMIA: ICD-10-CM

## 2025-04-10 LAB
ALBUMIN SERPL BCG-MCNC: 4 G/DL (ref 3.5–5.2)
ALP SERPL-CCNC: 58 U/L (ref 40–150)
ALT SERPL W P-5'-P-CCNC: 14 U/L (ref 0–50)
ANION GAP SERPL CALCULATED.3IONS-SCNC: 8 MMOL/L (ref 7–15)
AST SERPL W P-5'-P-CCNC: 22 U/L (ref 0–45)
BASOPHILS # BLD AUTO: 0 10E3/UL (ref 0–0.2)
BASOPHILS NFR BLD AUTO: 1 %
BILIRUB SERPL-MCNC: 0.3 MG/DL
BUN SERPL-MCNC: 8.3 MG/DL (ref 6–20)
CALCIUM SERPL-MCNC: 8.5 MG/DL (ref 8.8–10.4)
CHLORIDE SERPL-SCNC: 107 MMOL/L (ref 98–107)
CREAT SERPL-MCNC: 0.55 MG/DL (ref 0.51–0.95)
EGFRCR SERPLBLD CKD-EPI 2021: >90 ML/MIN/1.73M2
EOSINOPHIL # BLD AUTO: 0.2 10E3/UL (ref 0–0.7)
EOSINOPHIL NFR BLD AUTO: 4 %
ERYTHROCYTE [DISTWIDTH] IN BLOOD BY AUTOMATED COUNT: 15.5 % (ref 10–15)
GLUCOSE SERPL-MCNC: 89 MG/DL (ref 70–99)
HCG SERPL QL: NEGATIVE
HCO3 SERPL-SCNC: 28 MMOL/L (ref 22–29)
HCT VFR BLD AUTO: 34.8 % (ref 35–47)
HGB BLD-MCNC: 10.4 G/DL (ref 11.7–15.7)
IMM GRANULOCYTES # BLD: 0 10E3/UL
IMM GRANULOCYTES NFR BLD: 0 %
LIPASE SERPL-CCNC: 36 U/L (ref 13–60)
LYMPHOCYTES # BLD AUTO: 1.5 10E3/UL (ref 0.8–5.3)
LYMPHOCYTES NFR BLD AUTO: 26 %
MCH RBC QN AUTO: 20.8 PG (ref 26.5–33)
MCHC RBC AUTO-ENTMCNC: 29.9 G/DL (ref 31.5–36.5)
MCV RBC AUTO: 70 FL (ref 78–100)
MONOCYTES # BLD AUTO: 0.3 10E3/UL (ref 0–1.3)
MONOCYTES NFR BLD AUTO: 6 %
NEUTROPHILS # BLD AUTO: 3.7 10E3/UL (ref 1.6–8.3)
NEUTROPHILS NFR BLD AUTO: 64 %
NRBC # BLD AUTO: 0 10E3/UL
NRBC BLD AUTO-RTO: 0 /100
PLATELET # BLD AUTO: 340 10E3/UL (ref 150–450)
POTASSIUM SERPL-SCNC: 3.3 MMOL/L (ref 3.4–5.3)
PROT SERPL-MCNC: 7 G/DL (ref 6.4–8.3)
RBC # BLD AUTO: 4.99 10E6/UL (ref 3.8–5.2)
SODIUM SERPL-SCNC: 143 MMOL/L (ref 135–145)
WBC # BLD AUTO: 5.8 10E3/UL (ref 4–11)

## 2025-04-10 PROCEDURE — 84703 CHORIONIC GONADOTROPIN ASSAY: CPT | Performed by: EMERGENCY MEDICINE

## 2025-04-10 PROCEDURE — 36415 COLL VENOUS BLD VENIPUNCTURE: CPT | Performed by: EMERGENCY MEDICINE

## 2025-04-10 PROCEDURE — 250N000013 HC RX MED GY IP 250 OP 250 PS 637: Performed by: EMERGENCY MEDICINE

## 2025-04-10 PROCEDURE — 83690 ASSAY OF LIPASE: CPT | Performed by: EMERGENCY MEDICINE

## 2025-04-10 PROCEDURE — 96374 THER/PROPH/DIAG INJ IV PUSH: CPT

## 2025-04-10 PROCEDURE — 250N000011 HC RX IP 250 OP 636: Performed by: EMERGENCY MEDICINE

## 2025-04-10 PROCEDURE — 85025 COMPLETE CBC W/AUTO DIFF WBC: CPT | Performed by: EMERGENCY MEDICINE

## 2025-04-10 PROCEDURE — 99285 EMERGENCY DEPT VISIT HI MDM: CPT | Mod: 25

## 2025-04-10 PROCEDURE — 82565 ASSAY OF CREATININE: CPT | Performed by: EMERGENCY MEDICINE

## 2025-04-10 PROCEDURE — 76705 ECHO EXAM OF ABDOMEN: CPT

## 2025-04-10 PROCEDURE — 84155 ASSAY OF PROTEIN SERUM: CPT | Performed by: EMERGENCY MEDICINE

## 2025-04-10 RX ORDER — SUCRALFATE 1 G/1
1 TABLET ORAL 4 TIMES DAILY PRN
Qty: 120 TABLET | Refills: 0 | Status: SHIPPED | OUTPATIENT
Start: 2025-04-10

## 2025-04-10 RX ORDER — OMEPRAZOLE 40 MG/1
40 CAPSULE, DELAYED RELEASE ORAL DAILY
Qty: 30 CAPSULE | Refills: 0 | Status: SHIPPED | OUTPATIENT
Start: 2025-04-10

## 2025-04-10 RX ORDER — MAGNESIUM HYDROXIDE/ALUMINUM HYDROXICE/SIMETHICONE 120; 1200; 1200 MG/30ML; MG/30ML; MG/30ML
15 SUSPENSION ORAL ONCE
Status: COMPLETED | OUTPATIENT
Start: 2025-04-10 | End: 2025-04-10

## 2025-04-10 RX ORDER — POTASSIUM CHLORIDE 1500 MG/1
40 TABLET, EXTENDED RELEASE ORAL ONCE
Status: COMPLETED | OUTPATIENT
Start: 2025-04-10 | End: 2025-04-10

## 2025-04-10 RX ADMIN — FAMOTIDINE 20 MG: 10 INJECTION, SOLUTION INTRAVENOUS at 07:58

## 2025-04-10 RX ADMIN — ALUMINUM HYDROXIDE, MAGNESIUM HYDROXIDE, AND SIMETHICONE 15 ML: 200; 200; 20 SUSPENSION ORAL at 07:58

## 2025-04-10 RX ADMIN — POTASSIUM CHLORIDE 40 MEQ: 1500 TABLET, EXTENDED RELEASE ORAL at 08:24

## 2025-04-10 ASSESSMENT — COLUMBIA-SUICIDE SEVERITY RATING SCALE - C-SSRS
6. HAVE YOU EVER DONE ANYTHING, STARTED TO DO ANYTHING, OR PREPARED TO DO ANYTHING TO END YOUR LIFE?: NO
2. HAVE YOU ACTUALLY HAD ANY THOUGHTS OF KILLING YOURSELF IN THE PAST MONTH?: NO
1. IN THE PAST MONTH, HAVE YOU WISHED YOU WERE DEAD OR WISHED YOU COULD GO TO SLEEP AND NOT WAKE UP?: NO

## 2025-04-10 ASSESSMENT — ACTIVITIES OF DAILY LIVING (ADL): ADLS_ACUITY_SCORE: 52

## 2025-04-10 NOTE — ED PROVIDER NOTES
EMERGENCY DEPARTMENT ENCOUNTER      NAME: Nichelle Soriano  AGE: 45 year old female  YOB: 1980  MRN: [id number]  EVALUATION DATE & TIME: 4/10/2025  7:36 AM    PCP: No Ref-Primary, Physician    ED PROVIDER: Dory Ernst MD    Chief Complaint   Patient presents with    Abdominal Pain         FINAL IMPRESSION:  1. Gastroesophageal reflux disease without esophagitis    2. Hypokalemia    3. Anemia, unspecified type          ED COURSE & MEDICAL DECISION MAKING:    Pertinent Labs & Imaging studies reviewed. (See chart for details)  45 year old female with history of anemia, GERD who presents to the Emergency Department for evaluation of epigastric abdominal pain since last night, slightly burning in nature.  Did not respond to over-the-counter antacid this morning.  On examination patient has minimal epigastric tenderness to palpation.  No rebound or guarding.  Overall favor GERD.  Differential includes pancreatitis, hepatobiliary pathology, symptomatic cholelithiasis.  My concern that this represents ACS is exceedingly low.    Patient placed on monitor, IV established and blood obtained.  Given Maalox, IV Pepcid.  Bedside ultrasound performed, please see procedure note.  No visualized cholelithiasis.  CBC, CMP, lipase, pregnancy test normal for chronic anemia and slightly low potassium replaced orally.  On repeat assessment patient feels markedly improved.  Will discharge home with antacids and PCP follow-up.       ED Course as of 04/10/25 0841   Thu Apr 10, 2025   0806 Hemoglobin(!): 10.4  chronic   0819 HCG Qualitative Serum: Negative   0820 Potassium(!): 3.3       Medical Decision Making  I obtained history from Family Member/Significant Other  I reviewed the EMR: Prior Imagin2023 ultrasound the abdomen  Discharge. I prescribed additional prescription strength medication(s) as charted. See documentation for any additional details.    MIPS (CTPE, Dental pain, Ruiz, Sinusitis, Asthma/COPD, Head  "Trauma): Not Applicable    SEPSIS: None          At the conclusion of the encounter I discussed the results of all of the tests and the disposition. The questions were answered. The patient or family acknowledged understanding and was agreeable with the care plan.      MEDICATIONS GIVEN IN THE EMERGENCY:  Medications   alum & mag hydroxide-simethicone (MAALOX) suspension 15 mL (15 mLs Oral $Given 4/10/25 3898)   famotidine (PEPCID) injection 20 mg (20 mg Intravenous $Given 4/10/25 8431)   potassium chloride new ER (KLOR-CON M20) CR tablet 40 mEq (40 mEq Oral $Given 4/10/25 5535)       NEW PRESCRIPTIONS STARTED AT TODAY'S ER VISIT  New Prescriptions    OMEPRAZOLE (PRILOSEC) 40 MG DR CAPSULE    Take 1 capsule (40 mg) by mouth daily.    SUCRALFATE (CARAFATE) 1 GM TABLET    Take 1 tablet (1 g) by mouth 4 times daily as needed.          =================================================================    HPI    Patient information was obtained from: Patient and family    Use of Intrepreter: [facility]      Jazclaudiovivi Soriano is a 45 year old female with pertinent medical history of anemia, GERD who presents with epigastric abdominal pain since last night.  Patient states that it is slightly burning in nature.  Points to the epigastric and right upper quadrant area.  States that he has a sensation like food is not necessarily going down or getting stuck.  She does not however describe any matthias dysphagia but rather stating that she feels like a \"hard ball of food is not going through\".  Denies any fevers, chills.  No nausea, vomiting.  Patient denies any known history of gallstones, nor previous issues with pancreatitis.  On her medication list she has previous Pepcid and Carafate, but is not taking this regularly.  Tried some form of an antacid over-the-counter this morning without relief.  Prompting ED visit.  Currently menstruating.      PAST MEDICAL HISTORY:  No past medical history on file.    PAST SURGICAL " HISTORY:  Past Surgical History:   Procedure Laterality Date     SECTION      COMBINED  SECTION, SALPINGECTOMY BILATERAL Bilateral 2023    Procedure: REPEAT  SECTION WITH BILATERAL SALPINGECTOMY;  Surgeon: Meaghan Gabriel MD;  Location: [facility] OR       CURRENT MEDICATIONS:    Prior to Admission Medications   Prescriptions Last Dose Informant Patient Reported? Taking?   famotidine (PEPCID) 20 MG tablet   No No   Sig: Take 1 tablet (20 mg) by mouth 2 times daily   omeprazole (PRILOSEC) 20 MG DR capsule   No No   Sig: Take 1 capsule (20 mg) by mouth daily   sucralfate (CARAFATE) 1 GM/10ML suspension   No No   Sig: Take 10 mLs (1 g) by mouth 4 times daily as needed (pain)      Facility-Administered Medications: None       ALLERGIES:  No Known Allergies    FAMILY HISTORY:  Family History   Problem Relation Age of Onset    Thalassemia Daughter         Deanna Thalassemia DISEASE    Hashimoto's thyroiditis Daughter     No Known Problems Son     No Known Problems Son        SOCIAL HISTORY:  Social History     Tobacco Use    Smoking status: Never    Smokeless tobacco: Never    Tobacco comments:     no passive exposure   Substance Use Topics    Alcohol use: No    Drug use: No        VITALS:  Patient Vitals for the past 24 hrs:   BP Temp Temp src Pulse Resp SpO2 Weight   04/10/25 0800 122/74 -- -- 70 -- 100 % --   04/10/25 0745 (!) 150/87 -- -- -- -- 100 % --   04/10/25 0734 (!) 137/92 97.8  F (36.6  C) Temporal 67 14 100 % 64 kg (141 lb)       PHYSICAL EXAM    General Appearance: Well-appearing, well-nourished, no acute distress   Head:  Normocephalic  Cardio:  Regular rate and rhythm  Pulm:  No respiratory distress  Back:  No CVA tenderness, normal ROM  Abdomen:  Soft, mild epigastric tenderness, non distended,no rebound or guarding.  Extremities: Normal gait  Neuro:  Alert and oriented ×3     RADIOLOGY/LABS:  Reviewed all pertinent imaging. Please see official radiology report. All  pertinent labs reviewed and interpreted.    Results for orders placed or performed during the hospital encounter of 04/10/25   POC US ABDOMEN LIMITED    Impression    Limited right upper quadrant ultrasound performed by myself.  No visualized cholelithiasis, pericholecystic fluid or wall thickening.  Negative sonographic Rodriguez sign.   Comprehensive metabolic panel   Result Value Ref Range    Sodium 143 135 - 145 mmol/L    Potassium 3.3 (L) 3.4 - 5.3 mmol/L    Carbon Dioxide (CO2) 28 22 - 29 mmol/L    Anion Gap 8 7 - 15 mmol/L    Urea Nitrogen 8.3 6.0 - 20.0 mg/dL    Creatinine 0.55 0.51 - 0.95 mg/dL    GFR Estimate >90 >60 mL/min/1.73m2    Calcium 8.5 (L) 8.8 - 10.4 mg/dL    Chloride 107 98 - 107 mmol/L    Glucose 89 70 - 99 mg/dL    Alkaline Phosphatase 58 40 - 150 U/L    AST 22 0 - 45 U/L    ALT 14 0 - 50 U/L    Protein Total 7.0 6.4 - 8.3 g/dL    Albumin 4.0 3.5 - 5.2 g/dL    Bilirubin Total 0.3 <=1.2 mg/dL   Result Value Ref Range    Lipase 36 13 - 60 U/L   CBC with platelets and differential   Result Value Ref Range    WBC Count 5.8 4.0 - 11.0 10e3/uL    RBC Count 4.99 3.80 - 5.20 10e6/uL    Hemoglobin 10.4 (L) 11.7 - 15.7 g/dL    Hematocrit 34.8 (L) 35.0 - 47.0 %    MCV 70 (L) 78 - 100 fL    MCH 20.8 (L) 26.5 - 33.0 pg    MCHC 29.9 (L) 31.5 - 36.5 g/dL    RDW 15.5 (H) 10.0 - 15.0 %    Platelet Count 340 150 - 450 10e3/uL    % Neutrophils 64 %    % Lymphocytes 26 %    % Monocytes 6 %    % Eosinophils 4 %    % Basophils 1 %    % Immature Granulocytes 0 %    NRBCs per 100 WBC 0 <1 /100    Absolute Neutrophils 3.7 1.6 - 8.3 10e3/uL    Absolute Lymphocytes 1.5 0.8 - 5.3 10e3/uL    Absolute Monocytes 0.3 0.0 - 1.3 10e3/uL    Absolute Eosinophils 0.2 0.0 - 0.7 10e3/uL    Absolute Basophils 0.0 0.0 - 0.2 10e3/uL    Absolute Immature Granulocytes 0.0 <=0.4 10e3/uL    Absolute NRBCs 0.0 10e3/uL   hCG Qualitative Pregnancy   Result Value Ref Range    hCG Serum Qualitative Negative Negative         PROCEDURES:  POC US  ABDOMEN LIMITED    Date/Time: 4/10/2025 7:59 AM    Performed by: Dory Ernst MD  Authorized by: Dory Ernst MD    Comments:      Limited right upper quadrant ultrasound performed by myself.  No visualized cholelithiasis, pericholecystic fluid or wall thickening.  Negative sonographic Rodriguez sign.        Dory Ernst MD  Emergency Medicine  [facility] EMERGENCY DEPARTMENT  [facility] [facility] [address]  [phone]  Dept: [phone]     Dory Ernst MD  04/10/25 0834

## 2025-04-10 NOTE — DISCHARGE INSTRUCTIONS
Your blood work today is reassuring.  Your potassium was slightly low, replaced here.  Your hemoglobin or red blood cell count is chronically low, please follow-up with your primary for this.  The remainder of the blood work looks normal.  Bedside ultrasound does not show any evidence of gallbladder stones.  I do think that your symptoms are related to heartburn or reflux.  Take antacid medications as prescribed.

## 2025-04-10 NOTE — ED TRIAGE NOTES
"She started to have upper gastric area pain yesterday. When she eats it \"feels like the food gets stuck right here\" (and she points to upper gastric area).        "

## (undated) DEVICE — ESU LIGASURE OPEN SEALER/DIVIDER SM JAW 16.5MM LF1212A

## (undated) DEVICE — GLOVE BIOGEL PI ULTRATOUCH G SZ 6.5 42165

## (undated) DEVICE — PLATE GROUNDING ADULT W/CORD 9165L

## (undated) DEVICE — SUTURE MONOCRYL+ 4-0 PS-2 27IN MCP426H

## (undated) DEVICE — SOL WATER IRRIG 1000ML BOTTLE 2F7114

## (undated) DEVICE — DRESSING MEPILEX BORDER POST-OP 4X12

## (undated) DEVICE — LINER PRINTED RED HAZARD 24X24 A4824PRRO

## (undated) DEVICE — GOWN IMPERVIOUS BREATHABLE SMART LG 89015

## (undated) DEVICE — SOL NACL 0.9% IRRIG 1000ML BOTTLE 2F7124

## (undated) DEVICE — PACK MAJOR BASIN 673

## (undated) DEVICE — PAD INSERT MED PEACH 14X6.5 62550

## (undated) DEVICE — SURGICEL POWDER ABSORBABLE HEMOSTAT 3GM 3013SP

## (undated) DEVICE — DRESSING MEPILEX BORDER POST-OP 4X10

## (undated) DEVICE — SUTURE VICRYL+ 0 36IN CTX VIOLET VCP978H

## (undated) DEVICE — SU PLAIN 3-0 XLH  27" 52T

## (undated) DEVICE — STPL SKIN SUBCUTICULAR INSORB  2030

## (undated) DEVICE — SUCTION MANIFOLD NEPTUNE 2 SYS 1 PORT 702-025-000

## (undated) DEVICE — UNDERPAD 30X30 BULK 949B10

## (undated) DEVICE — PREP CHLORAPREP 26ML TINTED HI-LITE ORANGE 930815

## (undated) DEVICE — SU PLAIN 0 TIE 54" S104H

## (undated) DEVICE — CUSTOM PACK C-SECTION LHE

## (undated) DEVICE — PACK MINOR SINGLE BASIN SSK3001

## (undated) DEVICE — GLOVE UNDER INDICATOR PI SZ 6.5 LF 41665

## (undated) RX ORDER — MORPHINE SULFATE 1 MG/ML
INJECTION, SOLUTION EPIDURAL; INTRATHECAL; INTRAVENOUS
Status: DISPENSED
Start: 2023-06-29

## (undated) RX ORDER — ONDANSETRON 2 MG/ML
INJECTION INTRAMUSCULAR; INTRAVENOUS
Status: DISPENSED
Start: 2023-06-29

## (undated) RX ORDER — FENTANYL CITRATE-0.9 % NACL/PF 10 MCG/ML
PLASTIC BAG, INJECTION (ML) INTRAVENOUS
Status: DISPENSED
Start: 2023-06-29

## (undated) RX ORDER — EPHEDRINE SULFATE 50 MG/ML
INJECTION, SOLUTION INTRAMUSCULAR; INTRAVENOUS; SUBCUTANEOUS
Status: DISPENSED
Start: 2023-06-29